# Patient Record
Sex: FEMALE | Race: WHITE
[De-identification: names, ages, dates, MRNs, and addresses within clinical notes are randomized per-mention and may not be internally consistent; named-entity substitution may affect disease eponyms.]

---

## 2017-01-25 ENCOUNTER — HOSPITAL ENCOUNTER (EMERGENCY)
Dept: HOSPITAL 58 - ED | Age: 47
Discharge: HOME | End: 2017-01-25

## 2017-01-25 VITALS — SYSTOLIC BLOOD PRESSURE: 135 MMHG | TEMPERATURE: 96.7 F | DIASTOLIC BLOOD PRESSURE: 73 MMHG

## 2017-01-25 VITALS — BODY MASS INDEX: 31.1 KG/M2

## 2017-01-25 DIAGNOSIS — G43.909: Primary | ICD-10-CM

## 2017-01-25 PROCEDURE — 99282 EMERGENCY DEPT VISIT SF MDM: CPT

## 2017-01-25 PROCEDURE — 96372 THER/PROPH/DIAG INJ SC/IM: CPT

## 2017-01-25 PROCEDURE — 99283 EMERGENCY DEPT VISIT LOW MDM: CPT

## 2017-01-25 NOTE — ED.PDOC
General


ED Provider: 


Dr. YINA JENSEN-ER





Chief Complaint: Headache


Stated Complaint: ibulises got a migraine ha--it started this am


Time Seen by Physician: 18:50


Mode of Arrival: Walk-In


Information Source: Patient, Family


Exam Limitations: No limitations


Primary Care Provider: 


MYRON LANGSTON





Nursing and Triage Documentation Reviewed and Agree: Yes





Neurological Complaint Exam





- Headache Complaint/Exam


Onset: Gradual


Duration: 12hrs


Symptoms Are: Still present


Timing: Constant


Worst Headache Ever: No


Initial Severity: Moderate


Current Severity: Moderate


Location: Diffuse


Character: Reports: Dull, Throbbing, Pressure, Typical headache, Migraine


Aggravating: Reports: Bright lights


Alleviating: Reports: None


Associated Signs and Symptoms: Reports: Nausea, Vomiting


Related History: Reports: Similar episode.  Denies: Recent trauma, Remote trauma


Related Surgical History: Reports: None


SAH Risk Factors: Reports: None


Meningitis Risk Factors: Reports: None


Temporal Arteritis Risk Factors: Reports: Female, 


Normal Head CT Within Last 12 Months: Yes


Fundoscopic Exam: Present: Normal Findings


Papilledema Present: No


Temporal Artery Tenderness: Present: None


Sinus Tenderness: Present: None


TMJ Tenderness: Present: None


Glascow Coma Scale (see protocol): 15


Meningeal Signs Positive: No


Pain on Passive Flexion-Positive Kernig's: No


ROM Limited In: No Limitiations


Focal Weakness: Present: None


Focal Sensory Loss: Present: None


Gait: Normal


Nystagmus Present: No


Gag Reflex Present: Yes


Finger-to-Nose: Normal Findings


Romberg Test Positive: No


Babinski Sign: Negative Right, Negative Left


Heel to Toe Normal: Yes


Differential Diagnoses: Migraine





Review of Systems





- Review Of Systems


Constitutional: Reports: No symptoms


Eyes: Reports: No symptoms


Ears, Nose, Mouth, Throat: Reports: No symptoms


Respiratory: Reports: No symptoms


Cardiac: Reports: No symptoms


GI: Reports: No symptoms


: Reports: No symptoms


Musculoskeletal: Reports: No symptoms


Skin: Reports: No symptoms


Neurological: Reports: Headache


Endocrine: Reports: No symptoms


Hematologic/Lymphatic: Reports: No symptoms


All Other Systems: Reviewed and Negative





Past Medical History





- Past Medical History


Previously Healthy: No


Endocrine: Reports: Hypothyroid


Cardiovascular: Reports: Hypertension


Respiratory: Reports: COPD, Asthma


Hematological: Reports: None


Gastrointestinal: Reports: GERD (nexium ), Pancreatitis (stents with 

pancreatitis-chronic pancreatitis)


Genitourinary: Reports: None


Neuro/Psych: Reports: Migraine, Anxiety.  Denies: Depression (fluoxetine)


Musculoskeletal: Reports: Back Pain, Other (soma phenergan)


Cancer: Reports: None


Last Menstrual Period: 


Other Pertinent Past Medical History: Chronic back pain ,intrathecal pain pump 

and removal





- Surgical History


General Surgical History: Reports: Hysterectomy, , Cholecystectomy, 

Orthopedic (bilat. feet surg), Other (stents with pancreatitis-chronic 

pancreatitis)





- Family History


Family History: Reports: None





- Social History


Smoking Status: Never smoker


Hx Substance Use: No


Alcohol Screening: Occasionally


Lives: With family





- Immunizations


Tetanus Shot up to Date: Yes





Physical Exam





- Physical Exam


Appearance: Well-appearing, No pain distress, Well-nourished


Eyes: YEIMY


ENT: Ears normal


Neck: Supple


Respiratory: Airway patent, Breath sounds clear, Breath sounds equal, 

Respirations nonlabored


Cardiovascular: RRR


GI/: Soft, Nontender, No masses, Bowel sounds normal, No Organomegaly


Musculoskeletal: Normal strength, ROM intact, No edema, No calf tenderness


Skin: Warm, Dry, Normal color


Neurological: Sensation intact, Alert, Oriented


Psychiatric: Affect appropriate





Re-Evaluation





- Re-Evaluation


Time of Re-Evaluation: 19:15


Status: Unchanged


Vital Signs Stable: Yes


Pain Level: 1


Appearance: NAD


Lungs: Clear


Skin: Warm and Dry


Neuro: Alert and Oriented X3


CV: RRR





Critical Care Note





- Critical Care Note


Total Time (mins): 0





Course





- Course


Orders, Labs, Meds: 





Orders











 Category Date Time Status


 


 Diphenhydramine Inj [Benadryl] MEDS  17 18:48 Stat





 25 mg IM ONCE STA   


 


 Hydromorphone HCl/Pf [Dilaudid 2 mg/ml Syringe] MEDS  17 18:48 Stat





 2 mg IM ONCE STA   


 


 Promethazine HCl [Phenergan 25 mg/ml Vial] MEDS  17 18:48 Stat





 25 mg IM ONCE STA   








Medications











Generic Name Dose Route Start Last Admin





  Trade Name Freq  PRN Reason Stop Dose Admin


 


Diphenhydramine HCl  25 mg  17 18:48  





  Benadryl  IM  17 18:49  





  ONCE STA   


 


Hydromorphone HCl  2 mg  17 18:48  





  Dilaudid 2 Mg/Ml Syringe  IM  17 18:49  





  ONCE STA   


 


Promethazine HCl  25 mg  17 18:48  





  Phenergan 25 Mg/Ml Vial  IM  17 18:49  





  ONCE STA   











Vital Signs: 





 











  Temp Pulse Resp BP Pulse Ox


 


 17 18:30  96.7 F L  113 H  20  135/73  93 L














Departure





- Departure


Time of Disposition: 18:52


Disposition: HOME SELF-CARE


Discharge Problem: 


Migraine


Qualifiers:


 Migraine type: unspecified Status migrainosus presence: without status 

migrainosus Intractability: not intractable Qualifier Code: (G43.909) Migraine, 

unspecified, not intractable, without status migrainosus





Instructions:  Migraine Headache (ED)


Condition: Good


Pt referred to PMD for follow-up: Yes


Additional Instructions: 


f/'u with pcp


Allergies/Adverse Reactions: 


Allergies





divalproex sodium [From Depakote] Adverse Reaction (Verified 17 18:38)


 


ketorolac [From Toradol] Adverse Reaction (Verified 17 18:38)


 


metoclopramide HCl [From Reglan] Adverse Reaction (Verified 17 18:38)


 








Home Medications: 


Ambulatory Orders





Carisoprodol [Soma] 350 mg PO PRN PRN 09/02/15 


Promethazine HCl [Phenergan Tab] 50 mg PO PRN PRN 09/02/15 


Amitriptyline HCl [Elavil] 100 mg PO DAILY 17 








Disposition Discussed With: Patient, Family

## 2017-02-16 ENCOUNTER — HOSPITAL ENCOUNTER (EMERGENCY)
Dept: HOSPITAL 58 - ED | Age: 47
Discharge: HOME | End: 2017-02-16

## 2017-02-16 VITALS — SYSTOLIC BLOOD PRESSURE: 128 MMHG | DIASTOLIC BLOOD PRESSURE: 95 MMHG | TEMPERATURE: 97.6 F

## 2017-02-16 VITALS — BODY MASS INDEX: 30.7 KG/M2

## 2017-02-16 DIAGNOSIS — G43.809: Primary | ICD-10-CM

## 2017-02-16 PROCEDURE — 99283 EMERGENCY DEPT VISIT LOW MDM: CPT

## 2017-02-16 PROCEDURE — 96372 THER/PROPH/DIAG INJ SC/IM: CPT

## 2017-02-16 NOTE — ED.PDOC
General


ED Provider: 


Dr. YINA JENSEN-ER





Chief Complaint: Headache


Stated Complaint: sal got a migraine


Time Seen by Physician: 12:55


Mode of Arrival: Walk-In


Information Source: Patient, Family


Exam Limitations: No limitations


Primary Care Provider: 


MYRON LANGSTON





Nursing and Triage Documentation Reviewed and Agree: Yes





Neurological Complaint Exam





- Headache Complaint/Exam


Onset: Gradual


Duration: several hours


Symptoms Are: Still present


Timing: Constant


Episodes Lasting: Hours


Worst Headache Ever: No


Initial Severity: Mild


Current Severity: Moderate


Location: Diffuse


Character: Reports: Dull, Throbbing, Pressure, Typical headache, Migraine


Aggravating: Reports: Bright lights


Alleviating: Reports: None


Associated Signs and Symptoms: Reports: Nausea, Vomiting.  Denies: Dizziness, 

Seizure, Sinus pressure, Fever, Neck pain, Neck stiffness, Decreased LOC, 

Visual changes


Related History: Reports: Similar episode


Related Surgical History: Reports: None


SAH Risk Factors: Reports: None


Meningitis Risk Factors: Reports: None


SDH Risk Factors: Reports: None


Temporal Arteritis Risk Factors: Reports: Female


Normal Head CT Within Last 12 Months: Yes


Fundoscopic Exam: Present: Normal Findings


Papilledema Present: No


Temporal Artery Tenderness: Present: None


Sinus Tenderness: Present: None


TMJ Tenderness: Present: None


Glascow Coma Scale (see protocol): 15


Meningeal Signs Positive: No


Pain on Passive Flexion-Positive Kernig's: No


ROM Limited In: No Limitiations


Focal Weakness: Present: None


Focal Sensory Loss: Present: None


Gait: Normal


Nystagmus Present: No


Gag Reflex Present: Yes


Finger-to-Nose: Normal Findings


Romberg Test Positive: No


Babinski Sign: Negative Right, Negative Left


Heel to Toe Normal: Yes


Differential Diagnoses: Migraine





Review of Systems





- Review Of Systems


Constitutional: Reports: No symptoms


Eyes: Reports: No symptoms


Ears, Nose, Mouth, Throat: Reports: No symptoms


Respiratory: Reports: No symptoms


Cardiac: Reports: No symptoms


GI: Reports: Nausea, Vomiting


: Reports: No symptoms


Musculoskeletal: Reports: No symptoms


Skin: Reports: No symptoms


Neurological: Reports: Headache


Endocrine: Reports: No symptoms


Hematologic/Lymphatic: Reports: No symptoms


All Other Systems: Reviewed and Negative





Past Medical History





- Past Medical History


Previously Healthy: No


Endocrine: Reports: Hypothyroid


Cardiovascular: Reports: Hypertension


Respiratory: Reports: COPD, Asthma


Hematological: Reports: None


Gastrointestinal: Reports: GERD (nexium ), Pancreatitis (stents with 

pancreatitis-chronic pancreatitis)


Genitourinary: Reports: None


Neuro/Psych: Reports: Migraine, Anxiety.  Denies: Depression (fluoxetine)


Musculoskeletal: Reports: Back Pain, Other (soma phenergan)


Cancer: Reports: None


Last Menstrual Period: 


Other Pertinent Past Medical History: Chronic back pain ,intrathecal pain pump 

and removal





- Surgical History


General Surgical History: Reports: Hysterectomy, , Cholecystectomy, 

Orthopedic (bilat. feet surg), Other (stents with pancreatitis-chronic 

pancreatitis)





- Family History


Family History: Reports: None





- Social History


Smoking Status: Never smoker


Hx Substance Use: No


Alcohol Screening: Occasionally





- Immunizations


Tetanus Shot up to Date: Yes





Physical Exam





- Physical Exam


Appearance: Well-appearing


Pain Distress: Moderate


Eyes: YEIMY, EOMI, Conjunctiva clear


ENT: Ears normal, Nose normal, Oropharynx normal


Neck: Supple


Respiratory: Airway patent, Breath sounds clear, Breath sounds equal, 

Respirations nonlabored


Cardiovascular: RRR, Pulses normal, No rub, No murmur


GI/: Soft, Nontender, No masses, Bowel sounds normal, No Organomegaly


Musculoskeletal: Normal strength, ROM intact, No edema, No calf tenderness


Skin: Warm, Dry, Normal color


Neurological: Sensation intact, Motor intact, Reflexes intact, Cranial nerves 

intact, Alert, Oriented


Psychiatric: Affect appropriate, Mood appropriate





Re-Evaluation





- Re-Evaluation


Time of Re-Evaluation: 13:40


Status: Improved


Vital Signs Stable: Yes


Pain Level: 2


Appearance: NAD


Lungs: Clear


Skin: Warm and Dry


Neuro: Alert and Oriented X3


CV: RRR





Critical Care Note





- Critical Care Note


Total Time (mins): 0





Course





- Course


Orders, Labs, Meds: 





Orders











 Category Date Time Status


 


 Diphenhydramine Inj [Benadryl] MEDS  17 13:08 Discontinued





 50 mg IM ONCE STA   


 


 Hydromorphone HCl/Pf [Dilaudid 2 mg/ml Syringe] MEDS  17 13:08 

Discontinued





 2 mg IM ONCE STA   


 


 Promethazine HCl [Phenergan 25 mg/ml Vial] MEDS  17 13:08 Discontinued





 25 mg IM ONCE STA   








Medications














Discontinued Medications














Generic Name Dose Route Start Last Admin





  Trade Name Freq  PRN Reason Stop Dose Admin


 


Diphenhydramine HCl  50 mg  17 13:08  





  Benadryl  IM  17 13:09  





  ONCE STA   


 


Hydromorphone HCl  2 mg  17 13:08  





  Dilaudid 2 Mg/Ml Syringe  IM  17 13:09  





  ONCE STA   


 


Promethazine HCl  25 mg  17 13:08  





  Phenergan 25 Mg/Ml Vial  IM  17 13:09  





  ONCE STA   











Vital Signs: 





 











  Temp Pulse Resp BP Pulse Ox


 


 17 12:53  97.6 F  96 H  20  128/95 H  96














Departure





- Departure


Time of Disposition: 13:14


Disposition: HOME SELF-CARE


Discharge Problem: 


Migraine


Qualifiers:


 Migraine type: other Status migrainosus presence: without status migrainosus 

Intractability: not intractable Qualifier Code: (G43.809) Other migraine, not 

intractable, without status migrainosus





Instructions:  Migraine Headache (ED)


Condition: Good


Pt referred to PMD for follow-up: Yes


Additional Instructions: 


f/u wtih pcp


Allergies/Adverse Reactions: 


Allergies





divalproex sodium [From Depakote] Adverse Reaction (Verified 17 18:38)


 


ketorolac [From Toradol] Adverse Reaction (Verified 17 18:38)


 


metoclopramide HCl [From Reglan] Adverse Reaction (Verified 17 18:38)


 








Home Medications: 


Ambulatory Orders





Carisoprodol [Soma] 350 mg PO PRN PRN 09/02/15 


Promethazine HCl [Phenergan Tab] 50 mg PO PRN PRN 09/02/15 


Amitriptyline HCl [Elavil] 100 mg PO DAILY 17 








Disposition Discussed With: Patient, Family

## 2017-03-01 ENCOUNTER — HOSPITAL ENCOUNTER (EMERGENCY)
Dept: HOSPITAL 58 - ED | Age: 47
Discharge: HOME | End: 2017-03-01

## 2017-03-01 VITALS — BODY MASS INDEX: 30.7 KG/M2

## 2017-03-01 VITALS — TEMPERATURE: 97.2 F

## 2017-03-01 VITALS — DIASTOLIC BLOOD PRESSURE: 89 MMHG | SYSTOLIC BLOOD PRESSURE: 132 MMHG

## 2017-03-01 DIAGNOSIS — G43.009: Primary | ICD-10-CM

## 2017-03-01 PROCEDURE — 99282 EMERGENCY DEPT VISIT SF MDM: CPT

## 2017-03-01 PROCEDURE — 96372 THER/PROPH/DIAG INJ SC/IM: CPT

## 2017-03-01 NOTE — ED.PDOC
General


ED Provider: 


Dr. CHANTEL MEDELLIN





Chief Complaint: Headache


Stated Complaint: Patient is a 46 year old female who comes to the ER with 

Headaches that started this evening. She has had problems sleeping and was seen 

by her PCP but was not given any thing for headache. She was given Doxepin for 

insomnia.   Feels like her typical Migraine.


Time Seen by Physician: 20:41


Mode of Arrival: Walk-In


Information Source: Patient


Exam Limitations: No limitations


Primary Care Provider: 


MYRON LANGSTON





Nursing and Triage Documentation Reviewed and Agree: Yes





Neurological Complaint Exam





- Headache Complaint/Exam


Onset: Gradual


Duration: 1 day 


Symptoms Are: Still present


Timing: Constant


Worst Headache Ever: No


Initial Severity: Moderate


Current Severity: Severe


Location: Right, Left, Frontal


Character: Reports: Dull, Throbbing


Aggravating: Reports: Bright lights


Alleviating: Reports: None


Associated Signs and Symptoms: Denies: Dizziness, Seizure, Nausea, Vomiting, 

Sinus pressure, Fever, Neck pain, Neck stiffness, Decreased LOC, Visual changes


Related History: Reports: Similar episode


Related Surgical History: Reports: None


SAH Risk Factors: Reports: None


Meningitis Risk Factors: Reports: None


SDH Risk Factors: Reports: None


Temporal Arteritis Risk Factors: Reports: Female, .  Denies: Over 60 

years old, Polymyalgia Rheumatica


Normal Head CT Within Last 12 Months: Yes (1 year ago )


Fundoscopic Exam: Present: Normal Findings


Papilledema Present: Yes


Temporal Artery Tenderness: Present: None


Sinus Tenderness: Present: None


TMJ Tenderness: Present: None


Glascow Coma Scale (see protocol): 15


Meningeal Signs Positive: No


Pain on Passive Flexion-Positive Kernig's: No


ROM Limited In: No Limitiations


Focal Weakness: Present: None


Focal Sensory Loss: Present: None


Gait: Normal


Nystagmus Present: No


Gag Reflex Present: No


Finger-to-Nose: Normal Findings


Romberg Test Positive: No


Babinski Sign: Negative Right, Negative Left


Heel to Toe Normal: No


Differential Diagnoses: Migraine, Sinus Headache, Tension Headache, Viral 

Syndrome





Review of Systems





- Review Of Systems


Constitutional: Reports: No symptoms


Eyes: Reports: Pain, Photophobia


Ears, Nose, Mouth, Throat: Reports: No symptoms


Respiratory: Reports: No symptoms


Cardiac: Reports: No symptoms


GI: Reports: No symptoms


: Reports: No symptoms


Musculoskeletal: Reports: No symptoms


Skin: Reports: No symptoms


Neurological: Reports: Anxiety


Endocrine: Reports: No symptoms


Hematologic/Lymphatic: Reports: No symptoms


All Other Systems: Reviewed and Negative





Past Medical History





- Past Medical History


Previously Healthy: No


Endocrine: Reports: Hypothyroid


Cardiovascular: Reports: Hypertension


Respiratory: Reports: COPD, Asthma


Hematological: Reports: None


Gastrointestinal: Reports: GERD (nexium ), Pancreatitis (stents with 

pancreatitis-chronic pancreatitis)


Genitourinary: Reports: None


Neuro/Psych: Reports: Migraine, Anxiety.  Denies: Depression (fluoxetine)


Musculoskeletal: Reports: Back Pain, Other (soma phenergan)


Cancer: Reports: None


Last Menstrual Period: NONE


Other Pertinent Past Medical History: Chronic back pain ,intrathecal pain pump 

and removal





- Surgical History


General Surgical History: Reports: Hysterectomy, , Cholecystectomy, 

Orthopedic (bilat. feet surg), Other (stents with pancreatitis-chronic 

pancreatitis)





- Family History


Family History: Reports: None





- Social History


Smoking Status: Never smoker


Hx Substance Use: No


Alcohol Screening: Occasionally





Physical Exam





- Physical Exam


Appearance: Ill-appearing


Pain Distress: Moderate


Eyes: YEIMY, EOMI, Conjunctiva clear


ENT: Ears normal, Nose normal, Oropharynx normal


Neck: Supple


Respiratory: Airway patent, Breath sounds clear, Breath sounds equal, 

Respirations nonlabored


Cardiovascular: RRR, Pulses normal, No rub, No murmur


GI/: Soft, Nontender, No masses, Bowel sounds normal, No Organomegaly


Musculoskeletal: Normal strength, ROM intact, No edema, No calf tenderness


Skin: Warm, Dry, Normal color


Neurological: Sensation intact, Motor intact, Reflexes intact, Cranial nerves 

intact, Alert, Oriented


Psychiatric: Anxious





Re-Evaluation





- Re-Evaluation


Time of Re-Evaluation: 21:41


Status: Improved


Vital Signs Stable: Yes


Pain Level: improved 





Critical Care Note





- Critical Care Note


Total Time (mins): 0





Course





- Course


Orders, Labs, Meds: 


Orders











 Category Date Time Status


 


 Butorphanol Tartrate [Stadol] MEDS  17 20:37 Discontinued





 2 mg IM ONCE STA   


 


 Promethazine HCl [Phenergan 25 mg/ml Vial] MEDS  17 20:37 Discontinued





 25 mg IM ONCE STA   








Medications














Discontinued Medications














Generic Name Dose Route Start Last Admin





  Trade Name Freq  PRN Reason Stop Dose Admin


 


Butorphanol Tartrate  2 mg  17 20:37  17 20:48





  Stadol  IM  17 20:38  2 mg





  ONCE STA   Administration


 


Promethazine HCl  25 mg  17 20:37  17 20:48





  Phenergan 25 Mg/Ml Vial  IM  17 20:38  25 mg





  ONCE STA   Administration











Vital Signs: 


 











  Temp Pulse Resp BP Pulse Ox


 


 17 20:00  97.2 F L  105 H  18  132/89  96














Departure





- Departure


Time of Disposition: 21:41


Disposition: HOME SELF-CARE


Discharge Problem: 


 Headache





Migraine


Qualifiers:


 Migraine type: without aura Status migrainosus presence: without status 

migrainosus Intractability: not intractable Qualifier Code: (G43.009) Migraine 

without aura, not intractable, without status migrainosus





Instructions:  Migraine Headache (ED)


Condition: Stable


Pt referred to PMD for follow-up: Yes


Additional Instructions: 


Push fluids


Rest Take your Doxiepine for sleep 


Follow up with PCP in 3 days 


Allergies/Adverse Reactions: 


Allergies





divalproex sodium [From Depakote] Adverse Reaction (Verified 17 20:04)


 


ketorolac [From Toradol] Adverse Reaction (Verified 17 20:04)


 


metoclopramide HCl [From Reglan] Adverse Reaction (Verified 17 20:04)


 








Home Medications: 


Ambulatory Orders





Carisoprodol [Soma] 350 mg PO PRN PRN 09/02/15 


Promethazine HCl [Phenergan Tab] 50 mg PO PRN PRN 09/02/15 


Amitriptyline HCl [Elavil] 100 mg PO DAILY 17 


Doxepin HCl [Sinequan] 25 mg PO BEDTIME 17 








Disposition Discussed With: Patient

## 2017-03-08 ENCOUNTER — HOSPITAL ENCOUNTER (EMERGENCY)
Dept: HOSPITAL 58 - ED | Age: 47
Discharge: HOME | End: 2017-03-08

## 2017-03-08 VITALS — TEMPERATURE: 96.3 F

## 2017-03-08 VITALS — BODY MASS INDEX: 32.3 KG/M2

## 2017-03-08 VITALS — SYSTOLIC BLOOD PRESSURE: 105 MMHG | DIASTOLIC BLOOD PRESSURE: 67 MMHG

## 2017-03-08 DIAGNOSIS — G43.009: Primary | ICD-10-CM

## 2017-03-08 PROCEDURE — 99283 EMERGENCY DEPT VISIT LOW MDM: CPT

## 2017-03-08 PROCEDURE — 96372 THER/PROPH/DIAG INJ SC/IM: CPT

## 2017-03-08 NOTE — ED.PDOC
General


ED Provider: 


Dr. CHANTEL MEDELLIN





Chief Complaint: Headache


Stated Complaint: Patient complains of frontal headaches. She was seen last 

week with similar symtoms given Stadol and phenergen. Has not followed up with 

Neurology. Has been having difficulty sleeping Was tried on doxepine for sleep 

which has not helped.


Time Seen by Physician: 05:30


Mode of Arrival: Walk-In


Information Source: Patient


Exam Limitations: No limitations


Primary Care Provider: 


MYRON LANGSTON





Nursing and Triage Documentation Reviewed and Agree: Yes





Neurological Complaint Exam





- Headache Complaint/Exam


Onset: Sudden


Duration: 5 HOURS


Symptoms Are: Still present


Timing: Constant


Initial Severity: Severe


Current Severity: Severe


Location: Right, Left, Frontal


Character: Reports: Throbbing, Typical headache


Aggravating: Reports: Bright lights


Associated Signs and Symptoms: Reports: Nausea.  Denies: Dizziness, Fever, Neck 

pain, Neck stiffness, Decreased LOC, Visual changes


Related History: Reports: Similar episode


SAH Risk Factors: Reports: None


Meningitis Risk Factors: Reports: None


SDH Risk Factors: Reports: None


Temporal Arteritis Risk Factors: Reports: Female, .  Denies: Over 60 

years old, Polymyalgia Rheumatica


Normal Head CT Within Last 12 Months: Yes


Fundoscopic Exam: Present: Normal Findings


Papilledema Present: No


Temporal Artery Tenderness: Present: None


Sinus Tenderness: Present: None


TMJ Tenderness: Present: None


Glascow Coma Scale (see protocol): 15


Meningeal Signs Positive: No


Focal Weakness: Present: None


Focal Sensory Loss: Present: None


Gait: Normal


Nystagmus Present: No


Gag Reflex Present: No


Finger-to-Nose: Normal Findings


Romberg Test Positive: No


Babinski Sign: Negative Right, Negative Left


Heel to Toe Normal: No


Head Picture: 


  __________________________














  __________________________





 1 - HEADACHE





Differential Diagnoses: Migraine





Review of Systems





- Review Of Systems


Constitutional: Reports: Loss of appetite


Eyes: Reports: Photophobia


Ears, Nose, Mouth, Throat: Reports: No symptoms


Respiratory: Reports: No symptoms


Cardiac: Reports: No symptoms


GI: Reports: Nausea


: Reports: No symptoms


Musculoskeletal: Reports: No symptoms


Skin: Reports: No symptoms


Neurological: Reports: Anxiety, Headache


Endocrine: Reports: No symptoms


Hematologic/Lymphatic: Reports: No symptoms


All Other Systems: Reviewed and Negative





Past Medical History





- Past Medical History


Previously Healthy: No


Endocrine: Reports: Hypothyroid


Cardiovascular: Reports: Hypertension


Respiratory: Reports: COPD, Asthma


Hematological: Reports: None


Gastrointestinal: Reports: GERD (nexium ), Pancreatitis (stents with 

pancreatitis-chronic pancreatitis)


Genitourinary: Reports: None


Neuro/Psych: Reports: Migraine, Anxiety.  Denies: Depression (fluoxetine)


Musculoskeletal: Reports: Back Pain, Other (soma phenergan)


Cancer: Reports: None


Last Menstrual Period: PT HAS HAD HYSTERECTOMY


Other Pertinent Past Medical History: Chronic back pain ,intrathecal pain pump 

and removal





- Surgical History


General Surgical History: Reports: Hysterectomy, , Cholecystectomy, 

Orthopedic (bilat. feet surg), Other (stents with pancreatitis-chronic 

pancreatitis)





- Family History


Family History: Reports: None





- Social History


Smoking Status: Never smoker


Hx Substance Use: No


Alcohol Screening: Occasionally





- Immunizations


Tetanus Shot up to Date: Yes





Physical Exam





- Physical Exam


Appearance: Ill-appearing, Obese


Ill-appearing: Moderate


Pain Distress: Severe


Eyes: YEIMY, EOMI, Conjunctiva clear


ENT: Ears normal


Neck: Supple


Respiratory: Airway patent, Breath sounds clear, Breath sounds equal, 

Respirations nonlabored


Cardiovascular: RRR, Pulses normal, No rub, No murmur


Skin: Warm


Neurological: Sensation intact, Motor intact, Reflexes intact, Cranial nerves 

intact, Alert, Oriented


Psychiatric: Anxious





Critical Care Note





- Critical Care Note


Total Time (mins): 0





Course





- Course


Orders, Labs, Meds: 





Orders











 Category Date Time Status


 


 Butorphanol Tartrate [Stadol] MEDS  17 05:45 Stat





 2 mg IM ONCE STA   


 


 Promethazine HCl [Phenergan 25 mg/ml Vial] MEDS  17 05:36 Stat





 25 mg IM ONCE STA   








Medications














Discontinued Medications














Generic Name Dose Route Start Last Admin





  Trade Name Freq  PRN Reason Stop Dose Admin


 


Butorphanol Tartrate  2 mg  17 05:45  





  Stadol  IM  17 05:46  





  ONCE STA   


 


Promethazine HCl  25 mg  17 05:36  





  Phenergan 25 Mg/Ml Vial  IM  17 05:37  





  ONCE STA   











Vital Signs: 





 











  Temp Pulse Resp BP Pulse Ox


 


 17 05:19  96.3 F L  95 H  18  142/93 H  95














Departure





- Departure


Time of Disposition: 06:00


Disposition: HOME SELF-CARE


Discharge Problem: 


Migraine


Qualifiers:


 Migraine type: without aura Status migrainosus presence: without status 

migrainosus Intractability: not intractable Qualifier Code: (G43.009) Migraine 

without aura, not intractable, without status migrainosus





Instructions:  Migraine Headache (ED)


Condition: Fair


Pt referred to PMD for follow-up: Yes


Additional Instructions: 


FOLLOW UP WITH YOUR pcp FOR NEUROLOGY CONSULT 


TAKE MEDICATIONS AS PRESCRIBED. 


Prescriptions: 


Sumatriptan Succinate [Imitrex] 50 mg PO TID PRN #10 tablet


 PRN Reason: HEADACHE


Allergies/Adverse Reactions: 


Allergies





divalproex sodium [From Depakote] Adverse Reaction (Verified 17 05:27)


 


ketorolac [From Toradol] Adverse Reaction (Verified 17 05:27)


 


metoclopramide HCl [From Reglan] Adverse Reaction (Verified 17 05:27)


 








Home Medications: 


Ambulatory Orders





Carisoprodol [Soma] 350 mg PO PRN PRN 09/02/15 


Promethazine HCl [Phenergan Tab] 50 mg PO PRN PRN 09/02/15 


Amitriptyline HCl [Elavil] 100 mg PO DAILY 17 


Doxepin HCl [Sinequan] 25 mg PO BEDTIME 17 


Sumatriptan Succinate [Imitrex] 50 mg PO TID PRN #10 tablet 17 








Disposition Discussed With: Patient, Family

## 2017-03-22 ENCOUNTER — HOSPITAL ENCOUNTER (EMERGENCY)
Dept: HOSPITAL 58 - ED | Age: 47
Discharge: HOME | End: 2017-03-22

## 2017-03-22 VITALS — TEMPERATURE: 96.9 F | DIASTOLIC BLOOD PRESSURE: 104 MMHG | SYSTOLIC BLOOD PRESSURE: 154 MMHG

## 2017-03-22 VITALS — BODY MASS INDEX: 32.3 KG/M2

## 2017-03-22 DIAGNOSIS — G43.909: Primary | ICD-10-CM

## 2017-03-22 PROCEDURE — 96372 THER/PROPH/DIAG INJ SC/IM: CPT

## 2017-03-22 PROCEDURE — 99283 EMERGENCY DEPT VISIT LOW MDM: CPT

## 2017-03-22 NOTE — ED.PDOC
General


ED Provider: 


Dr. ARACELY CALABRESE JR





Chief Complaint: Headache


Stated Complaint: THIS AM AROUND 0800 headache 96.9 88 20 94% 154/104 10/10 

frontal -medications helped last time feels that she has tried all 

preventatives no nerologist discussed ketamine ablation- will need neurologist 

states headache usual horrible pain no different does not want CT head.  Butorphanol 2 mg  Stadol,Phenergan 25 Mg:  Migraine Headache (ED) pcp FOR 

NEUROLOGY [Imitrex] 50 mg PO TID PRN #10 tablet.  17 2 mg  Stadol,

Phenergan 25 Mg.  17 benadryl 50 dilaudid 2 phenergan 25 migraine


Time Seen by Physician: 13:03


Mode of Arrival: Walk-In


Information Source: Patient, Family


Exam Limitations: No limitations


Primary Care Provider: 


MYRON LANGSTON





Nursing and Triage Documentation Reviewed and Agree: No





Review of Systems





- Review Of Systems


Constitutional: Reports: Malaise


Eyes: Reports: Photophobia


Respiratory: Reports: No symptoms


Cardiac: Reports: No symptoms


GI: Reports: No symptoms


: Reports: No symptoms


Musculoskeletal: Reports: No symptoms


Skin: Reports: No symptoms


Neurological: Reports: Headache


Endocrine: Reports: No symptoms


Hematologic/Lymphatic: Reports: No symptoms


All Other Systems: Other





Past Medical History





- Past Medical History


Previously Healthy: No


Endocrine: Reports: Hypothyroid


Cardiovascular: Reports: Hypertension


Respiratory: Reports: COPD, Asthma


Hematological: Reports: None


Gastrointestinal: Reports: GERD (nexium ), Pancreatitis (stents with 

pancreatitis-chronic pancreatitis)


Genitourinary: Reports: None


Neuro/Psych: Reports: Migraine, Anxiety.  Denies: Depression (fluoxetine)


Musculoskeletal: Reports: Back Pain, Other (soma phenergan)


Cancer: Reports: None


Last Menstrual Period: 


Other Pertinent Past Medical History: Chronic back pain ,intrathecal pain pump 

and removal





- Surgical History


General Surgical History: Reports: Hysterectomy, , Cholecystectomy, 

Orthopedic (bilat. feet surg), Other (stents with pancreatitis-chronic 

pancreatitis)





- Family History


Family History: Reports: None





- Social History


Smoking Status: Never smoker


Hx Substance Use: No


Alcohol Screening: Occasionally





- Immunizations


Tetanus Shot up to Date: Yes





Physical Exam





- Physical Exam


Appearance: Well-appearing, Obese


Ill-appearing: Moderate


Pain Distress: Severe


Eyes: YEIMY, EOMI, Conjunctiva clear


ENT: Ears normal, Nose normal, Oropharynx normal


Neck: Supple


Respiratory: Airway patent, Breath sounds clear, Breath sounds equal, 

Respirations nonlabored


Cardiovascular: RRR, Pulses normal, No rub, No murmur


GI/: Soft, Nontender, No masses, Bowel sounds normal, No Organomegaly


Musculoskeletal: Normal strength, ROM intact, No edema, No calf tenderness


Skin: Warm, Dry, Normal color


Neurological: Sensation intact, Motor intact, Reflexes intact, Cranial nerves 

intact, Alert, Oriented


Psychiatric: Affect appropriate





Critical Care Note





- Critical Care Note


Total Time (mins): 0





Course





- Course


Orders, Labs, Meds: 


Orders











 Category Date Time Status


 


 Butorphanol Tartrate [Stadol] MEDS  17 12:56 Discontinued





 2 mg IM ONCE STA   


 


 Promethazine HCl [Phenergan 25 mg/ml Vial] MEDS  17 12:56 Discontinued





 25 mg IM ONCE STA   


 


 CT HEAD W/O CONTRAST Stat RADS  17 12:57 Stop Req








Medications














Discontinued Medications














Generic Name Dose Route Start Last Admin





  Trade Name Freq  PRN Reason Stop Dose Admin


 


Butorphanol Tartrate  2 mg  17 12:56  17 13:05





  Stadol  IM  17 12:57  2 mg





  ONCE STA   Administration


 


Promethazine HCl  25 mg  17 12:56  17 13:05





  Phenergan 25 Mg/Ml Vial  IM  17 12:57  25 mg





  ONCE STA   Administration











Vital Signs: 


 











  Temp Pulse Resp BP Pulse Ox


 


 17 11:46  96.9 F L  88  20  154/104 H  94 L














Departure





- Departure


Time of Disposition: 13:44


Disposition: HOME SELF-CARE


Discharge Problem: 


 Headache, Migraine





Instructions:  Acute Headache (ED)


Condition: Good


Pt referred to PMD for follow-up: Yes


Additional Instructions: 


return if unable to keep fluids down, if symptoms are worse





no CT of the brain found at [facility]


need to define if any serious changes present in brain


CT Brain is recommended


no neurologist - will need neurologist


ketamine ablation has been reported to be helpful for intractable migraines





[facility] neurology takes Medicaid- you will need a referral from your physician


[phone] fax 


Allergies/Adverse Reactions: 


Allergies





divalproex sodium [From Depakote] Adverse Reaction (Verified 17 05:27)


 


ketorolac [From Toradol] Adverse Reaction (Verified 17 05:27)


 


metoclopramide HCl [From Reglan] Adverse Reaction (Verified 17 05:27)


 








Home Medications: 


Ambulatory Orders





Carisoprodol [Soma] 350 mg PO PRN PRN 09/02/15 


Promethazine HCl [Phenergan Tab] 50 mg PO PRN PRN 09/02/15 


Amitriptyline HCl [Elavil] 100 mg PO DAILY 17 


Doxepin HCl [Sinequan] 25 mg PO BEDTIME 17 


Sumatriptan Succinate [Imitrex] 50 mg PO TID PRN #10 tablet 17

## 2017-04-20 ENCOUNTER — HOSPITAL ENCOUNTER (EMERGENCY)
Dept: HOSPITAL 58 - ED | Age: 47
Discharge: HOME | End: 2017-04-20

## 2017-04-20 VITALS — SYSTOLIC BLOOD PRESSURE: 128 MMHG | DIASTOLIC BLOOD PRESSURE: 91 MMHG | TEMPERATURE: 97.8 F

## 2017-04-20 VITALS — BODY MASS INDEX: 33 KG/M2

## 2017-04-20 DIAGNOSIS — G43.909: Primary | ICD-10-CM

## 2017-04-20 PROCEDURE — 96372 THER/PROPH/DIAG INJ SC/IM: CPT

## 2017-04-20 PROCEDURE — 99283 EMERGENCY DEPT VISIT LOW MDM: CPT

## 2017-04-20 NOTE — CT
Exam:  CT exam of the brain without intravenous contrast. 

  

Comparison:  03/09/2016. 

  

Reason for exam:  Pain. 

  

FINDINGS:  No acute intracranial hemorrhage, mass effect, ventricular dilatation, or territorial inf
arction.  The quadrigeminal and ambient cisterns are patent.  There is no extraaxial fluid collectio
n.  The calvarium is intact without displaced fracture.  The paranasal sinuses and mastoid air cells
 are unopacified.  There is incidental note of plagiocephaly unchanged from the prior exam. 

  

Impression:  No acute intracranial findings.

## 2017-04-23 ENCOUNTER — HOSPITAL ENCOUNTER (EMERGENCY)
Dept: HOSPITAL 58 - ED | Age: 47
Discharge: HOME | End: 2017-04-23

## 2017-04-23 VITALS — SYSTOLIC BLOOD PRESSURE: 128 MMHG | TEMPERATURE: 97.2 F | DIASTOLIC BLOOD PRESSURE: 72 MMHG

## 2017-04-23 VITALS — BODY MASS INDEX: 33 KG/M2

## 2017-04-23 DIAGNOSIS — G43.009: Primary | ICD-10-CM

## 2017-04-23 PROCEDURE — 99283 EMERGENCY DEPT VISIT LOW MDM: CPT

## 2017-04-23 PROCEDURE — 96372 THER/PROPH/DIAG INJ SC/IM: CPT

## 2017-04-23 NOTE — ED.PDOC
General


ED Provider: 


Dr. YINA JENSEN-ER





Chief Complaint: Headache


Stated Complaint: sal got a migraine ha


Time Seen by Physician: 19:36


Mode of Arrival: Walk-In


Information Source: Patient


Exam Limitations: No limitations


Primary Care Provider: 


MYRON LANGSTON





Nursing and Triage Documentation Reviewed and Agree: Yes





Neurological Complaint Exam





- Headache Complaint/Exam


Onset: Gradual


Duration: several hours


Symptoms Are: Still present


Timing: Constant


Episodes Lasting: Hours


Worst Headache Ever: No


Initial Severity: Mild


Current Severity: Moderate


Location: Diffuse


Character: Reports: Dull, Throbbing, Typical headache, Migraine


Aggravating: Reports: Bright lights


Alleviating: Reports: None


Associated Signs and Symptoms: Reports: Nausea.  Denies: Dizziness, Seizure, 

Vomiting, Sinus pressure, Fever, Neck pain, Neck stiffness, Decreased LOC, 

Visual changes


Related History: Reports: Similar episode.  Denies: Recent trauma, Remote trauma


Related Surgical History: Reports: None


SAH Risk Factors: Reports: None


Meningitis Risk Factors: Reports: None


SDH Risk Factors: Reports: None


Temporal Arteritis Risk Factors: Reports: None, Female, 


Normal Head CT Within Last 12 Months: Yes


Fundoscopic Exam: Present: Normal Findings


Papilledema Present: No


Temporal Artery Tenderness: Present: None


Sinus Tenderness: Present: None


TMJ Tenderness: Present: None


Glascow Coma Scale (see protocol): 15


Meningeal Signs Positive: No


Pain on Passive Flexion-Positive Kernig's: No


ROM Limited In: No Limitiations


Focal Weakness: Present: None


Focal Sensory Loss: Present: None


Gait: Normal


Nystagmus Present: No


Gag Reflex Present: Yes


Finger-to-Nose: Normal Findings


Romberg Test Positive: No


Babinski Sign: Negative Right, Negative Left


Heel to Toe Normal: Yes


Differential Diagnoses: Migraine





Review of Systems





- Review Of Systems


Constitutional: Reports: No symptoms


Eyes: Reports: No symptoms


Ears, Nose, Mouth, Throat: Reports: No symptoms


Respiratory: Reports: No symptoms


Cardiac: Reports: No symptoms


GI: Reports: Nausea


: Reports: No symptoms


Musculoskeletal: Reports: No symptoms


Skin: Reports: No symptoms


Neurological: Reports: No symptoms


Endocrine: Reports: No symptoms


Hematologic/Lymphatic: Reports: No symptoms


All Other Systems: Reviewed and Negative





Past Medical History





- Past Medical History


Previously Healthy: No


Endocrine: Reports: Hypothyroid


Cardiovascular: Reports: Hypertension


Respiratory: Reports: COPD, Asthma


Hematological: Reports: None


Gastrointestinal: Reports: GERD (nexium ), Pancreatitis (stents with 

pancreatitis-chronic pancreatitis)


Genitourinary: Reports: None


Neuro/Psych: Reports: Migraine, Anxiety.  Denies: Depression (fluoxetine)


Musculoskeletal: Reports: Back Pain, Other (soma phenergan)


Cancer: Reports: None


Last Menstrual Period: PT HAS HAD A HYSTERECTOMY


Other Pertinent Past Medical History: Chronic back pain ,intrathecal pain pump 

and removal





- Surgical History


General Surgical History: Reports: Hysterectomy, , Cholecystectomy, 

Orthopedic (bilat. feet surg), Other (stents with pancreatitis-chronic 

pancreatitis)





- Family History


Family History: Reports: None





- Social History


Smoking Status: Never smoker


Hx Substance Use: No


Alcohol Screening: Occasionally


Lives: With family





- Immunizations


Tetanus Shot up to Date: Yes





Physical Exam





- Physical Exam


Appearance: Well-appearing, No pain distress, Well-nourished


Pain Distress: Moderate


Eyes: YEIMY, EOMI, Conjunctiva clear


ENT: Ears normal, Nose normal, Oropharynx normal


Neck: Supple


Respiratory: Airway patent


Cardiovascular: RRR, Pulses normal, No rub, No murmur


GI/: Soft, Nontender, No masses, Bowel sounds normal, No Organomegaly


Musculoskeletal: Normal strength, ROM intact, No edema, No calf tenderness


Skin: Warm


Neurological: Sensation intact


Psychiatric: Affect appropriate, Mood appropriate





Re-Evaluation





- Re-Evaluation


Time of Re-Evaluation: 20:00


Status: Improved


Vital Signs Stable: Yes


Pain Level: 1


Appearance: NAD


Lungs: Clear


Skin: Warm and Dry


Neuro: Alert and Oriented X3


CV: RRR





Critical Care Note





- Critical Care Note


Total Time (mins): 0





Course





- Course


Orders, Labs, Meds: 





Orders











 Category Date Time Status


 


 Butorphanol Tartrate [Stadol] MEDS  17 19:34 Discontinued





 2 mg IM ONCE STA   


 


 Promethazine HCl [Phenergan 25 mg/ml Vial] MEDS  17 19:34 Discontinued





 25 mg IM ONCE STA   








Medications














Discontinued Medications














Generic Name Dose Route Start Last Admin





  Trade Name Walterq  PRN Reason Stop Dose Admin


 


Butorphanol Tartrate  2 mg  17 19:34  





  Stadol  IM  17 19:35  





  ONCE STA   


 


Promethazine HCl  25 mg  17 19:34  





  Phenergan 25 Mg/Ml Vial  IM  17 19:35  





  ONCE STA   











Vital Signs: 





 











  Temp Pulse Resp BP Pulse Ox


 


 17 19:21  97.2 F L  106 H  20  128/72  96














Departure





- Departure


Time of Disposition: 19:38


Disposition: HOME SELF-CARE


Discharge Problem: 


Migraine


Qualifiers:


 Migraine type: without aura Status migrainosus presence: without status 

migrainosus Intractability: not intractable Qualifier Code: (G43.009) Migraine 

without aura, not intractable, without status migrainosus





Instructions:  Migraine Headache (ED)


Condition: Good


Pt referred to PMD for follow-up: Yes


Additional Instructions: 


f/u with pcp


Allergies/Adverse Reactions: 


Allergies





divalproex sodium [From Depakote] Adverse Reaction (Verified 17 19:26)


 


ketorolac [From Toradol] Adverse Reaction (Verified 17 19:26)


 


metoclopramide HCl [From Reglan] Adverse Reaction (Verified 17 19:26)


 








Home Medications: 


Ambulatory Orders





Carisoprodol [Soma] 350 mg PO PRN PRN 09/02/15 


Promethazine HCl [Phenergan Tab] 50 mg PO PRN PRN 09/02/15 


Amitriptyline HCl [Elavil] 100 mg PO DAILY 17 


Doxepin HCl [Sinequan] 25 mg PO BEDTIME 17 


Sumatriptan Succinate [Imitrex] 50 mg PO TID PRN #10 tablet 17 








Disposition Discussed With: Patient, Family

## 2017-05-14 ENCOUNTER — HOSPITAL ENCOUNTER (EMERGENCY)
Dept: HOSPITAL 58 - ED | Age: 47
Discharge: HOME | End: 2017-05-14

## 2017-05-14 VITALS — BODY MASS INDEX: 33 KG/M2

## 2017-05-14 VITALS — TEMPERATURE: 96 F | SYSTOLIC BLOOD PRESSURE: 145 MMHG | DIASTOLIC BLOOD PRESSURE: 87 MMHG

## 2017-05-14 DIAGNOSIS — G43.009: Primary | ICD-10-CM

## 2017-05-14 PROCEDURE — 99282 EMERGENCY DEPT VISIT SF MDM: CPT

## 2017-05-14 PROCEDURE — 96372 THER/PROPH/DIAG INJ SC/IM: CPT

## 2017-05-14 NOTE — ED.PDOC
General


ED Provider: 


Dr. CHE EISENBERG





Chief Complaint: Headache


Stated Complaint: typical migrane, home meds not helping


Time Seen by Physician: 09:08


Mode of Arrival: Walk-In


Information Source: Patient


Primary Care Provider: 


MYRON LANGSTON





Nursing and Triage Documentation Reviewed and Agree: Yes





Neurological Complaint Exam





- Headache Complaint/Exam


Onset: Gradual


Symptoms Are: Still present


Timing: Constant


Episodes Lasting: Minutes


Worst Headache Ever: No


Initial Severity: Severe


Current Severity: Severe


Location: Right, Left, Frontal


Character: Reports: Dull, Throbbing


Aggravating: Reports: None


Alleviating: Reports: None


Associated Signs and Symptoms: Denies: Dizziness, Seizure, Nausea, Vomiting, 

Sinus pressure, Fever, Neck pain, Neck stiffness, Decreased LOC, Visual changes


Related Surgical History: Reports: None


SAH Risk Factors: Reports: None


Meningitis Risk Factors: Reports: None


SDH Risk Factors: Reports: None


Temporal Arteritis Risk Factors: Reports: None


Normal Head CT Within Last 12 Months: Yes


Fundoscopic Exam: Present: Normal Findings


Papilledema Present: No


Temporal Artery Tenderness: Present: None


Sinus Tenderness: Present: None


TMJ Tenderness: Present: None


Meningeal Signs Positive: No


Pain on Passive Flexion-Positive Kernig's: No


ROM Limited In: No Limitiations


Focal Weakness: Present: None


Focal Sensory Loss: Present: None


Gait: Normal


Nystagmus Present: No


Gag Reflex Present: Yes


Finger-to-Nose: Normal Findings


Babinski Sign: Negative Right, Negative Left


Differential Diagnoses: Migraine





Review of Systems





- Review Of Systems


Constitutional: Reports: No symptoms


Eyes: Reports: No symptoms


Ears, Nose, Mouth, Throat: Reports: No symptoms


Respiratory: Reports: No symptoms


Cardiac: Reports: No symptoms


GI: Reports: No symptoms


: Reports: No symptoms


Musculoskeletal: Reports: No symptoms


Skin: Reports: No symptoms


Neurological: Reports: Headache


Endocrine: Reports: No symptoms


Hematologic/Lymphatic: Reports: No symptoms


All Other Systems: Reviewed and Negative





Past Medical History





- Past Medical History


Previously Healthy: No


Endocrine: Reports: Hypothyroid


Cardiovascular: Reports: Hypertension


Respiratory: Reports: COPD, Asthma


Hematological: Reports: None


Gastrointestinal: Reports: GERD (nexium ), Pancreatitis (stents with 

pancreatitis-chronic pancreatitis)


Genitourinary: Reports: None


Neuro/Psych: Reports: Migraine, Anxiety.  Denies: Depression (fluoxetine)


Musculoskeletal: Reports: Back Pain, Other (soma phenergan)


Cancer: Reports: None


Last Menstrual Period: N/A


Other Pertinent Past Medical History: Chronic back pain ,intrathecal pain pump 

and removal





- Surgical History


General Surgical History: Reports: Hysterectomy, , Cholecystectomy, 

Orthopedic (bilat. feet surg), Other (stents with pancreatitis-chronic 

pancreatitis)





- Family History


Family History: Reports: None





- Social History


Smoking Status: Never smoker


Hx Substance Use: No


Alcohol Screening: Occasionally





- Immunizations


Tetanus Shot up to Date: No





Physical Exam





- Physical Exam


Appearance: Ill-appearing, Obese


Pain Distress: Moderate


Eyes: EOMI


ENT: Ears normal, Nose normal, Oropharynx normal


Respiratory: Airway patent, Breath sounds clear, Breath sounds equal, 

Respirations nonlabored


Cardiovascular: RRR, Pulses normal, No rub, No murmur


GI/: Soft, Nontender, No masses, Bowel sounds normal, No Organomegaly


Musculoskeletal: Normal strength, ROM intact, No edema, No calf tenderness


Skin: Warm, Dry, Normal color


Neurological: Sensation intact, Motor intact, Reflexes intact, Cranial nerves 

intact, Alert, Oriented


Psychiatric: Affect appropriate, Mood appropriate





Critical Care Note





- Critical Care Note


Total Time (mins): 0





Course





- Course


Orders, Labs, Meds: 





Orders











 Category Date Time Status


 


 Butorphanol Tartrate [Stadol] MEDS  17 09:07 Stat





 2 mg IM ONCE STA   


 


 Promethazine HCl [Phenergan 25 mg/ml Vial] MEDS  17 09:07 Stat





 25 mg IM ONCE STA   








Medications














Discontinued Medications














Generic Name Dose Route Start Last Admin





  Trade Name Freq  PRN Reason Stop Dose Admin


 


Butorphanol Tartrate  2 mg  17 09:07  





  Stadol  IM  17 09:08  





  ONCE STA   


 


Promethazine HCl  25 mg  17 09:07  





  Phenergan 25 Mg/Ml Vial  IM  17 09:08  





  ONCE STA   











Vital Signs: 





 











  Temp Pulse Resp BP Pulse Ox


 


 17 08:48  96.0 F L  107 H  18  145/87 H  96














Departure





- Departure


Time of Disposition: 10:00


Disposition: HOME SELF-CARE


Discharge Problem: 


Migraine


Qualifiers:


 Migraine type: without aura Status migrainosus presence: without status 

migrainosus Intractability: not intractable Qualifier Code: (G43.009) Migraine 

without aura, not intractable, without status migrainosus





Instructions:  Migraine Headache (ED)


Condition: Stable


Pt referred to PMD for follow-up: No


Additional Instructions: 


Needs f/u with neurologist as out patient, as she keeps having the episodes 

despite the maintenance medications.


head dairy








Allergies/Adverse Reactions: 


Allergies





divalproex sodium [From Depakote] Adverse Reaction (Verified 17 08:55)


 


ketorolac [From Toradol] Adverse Reaction (Verified 17 08:55)


 


metoclopramide HCl [From Reglan] Adverse Reaction (Verified 17 08:55)


 








Home Medications: 


Ambulatory Orders





Carisoprodol [Soma] 350 mg PO PRN PRN 09/02/15 


Promethazine HCl [Phenergan Tab] 50 mg PO PRN PRN 09/02/15 


Amitriptyline HCl [Elavil] 100 mg PO DAILY 17 


Doxepin HCl [Sinequan] 25 mg PO BEDTIME 17 


Sumatriptan Succinate [Imitrex] 50 mg PO TID PRN #10 tablet 17 








Disposition Discussed With: Patient

## 2017-05-16 ENCOUNTER — HOSPITAL ENCOUNTER (EMERGENCY)
Dept: HOSPITAL 58 - ED | Age: 47
Discharge: HOME | End: 2017-05-16

## 2017-05-16 VITALS — BODY MASS INDEX: 33 KG/M2

## 2017-05-16 VITALS — TEMPERATURE: 98.8 F | SYSTOLIC BLOOD PRESSURE: 136 MMHG | DIASTOLIC BLOOD PRESSURE: 82 MMHG

## 2017-05-16 DIAGNOSIS — G43.009: Primary | ICD-10-CM

## 2017-05-16 PROCEDURE — 99283 EMERGENCY DEPT VISIT LOW MDM: CPT

## 2017-05-16 PROCEDURE — 96372 THER/PROPH/DIAG INJ SC/IM: CPT

## 2017-05-16 NOTE — ED.PDOC
General


ED Provider: 


Dr. CHE EISENBERG





Chief Complaint: Headache


Stated Complaint: typical headache, ran out of imitrex.


Time Seen by Physician: 19:42


Mode of Arrival: Walk-In


Information Source: Patient


Primary Care Provider: 


MYRON LANGSTON





Nursing and Triage Documentation Reviewed and Agree: Yes





Neurological Complaint Exam





- Headache Complaint/Exam


Onset: Gradual


Symptoms Are: Still present


Timing: Constant


Episodes Lasting: Hours


Worst Headache Ever: No


Initial Severity: Severe


Current Severity: Severe


Location: Right, Left, Frontal


Character: Reports: Throbbing


Aggravating: Reports: Bright lights


Alleviating: Reports: None


Associated Signs and Symptoms: Reports: Nausea.  Denies: Dizziness, Seizure, 

Vomiting, Sinus pressure, Fever, Neck pain, Neck stiffness, Decreased LOC, 

Visual changes


Related Surgical History: Reports: None


SAH Risk Factors: Reports: None


Meningitis Risk Factors: Reports: None


SDH Risk Factors: Reports: None


Temporal Arteritis Risk Factors: Reports: None


Normal Head CT Within Last 12 Months: Yes


Fundoscopic Exam: Present: Normal Findings


Temporal Artery Tenderness: Present: None


Sinus Tenderness: Present: None


TMJ Tenderness: Present: None


Meningeal Signs Positive: Yes


Pain on Passive Flexion-Positive Kernig's: Yes


ROM Limited In: No Limitiations


Focal Weakness: Present: None


Focal Sensory Loss: Present: None


Gait: Normal


Nystagmus Present: No


Gag Reflex Present: Yes


Finger-to-Nose: Normal Findings


Differential Diagnoses: Migraine





Review of Systems





- Review Of Systems


Constitutional: Reports: No symptoms


Eyes: Reports: No symptoms


Ears, Nose, Mouth, Throat: Reports: No symptoms


Respiratory: Reports: No symptoms


Cardiac: Reports: No symptoms


GI: Reports: No symptoms


: Reports: No symptoms


Musculoskeletal: Reports: No symptoms


Skin: Reports: No symptoms


Neurological: Reports: Headache


Endocrine: Reports: No symptoms


Hematologic/Lymphatic: Reports: No symptoms


All Other Systems: Reviewed and Negative





Past Medical History





- Past Medical History


Previously Healthy: No


Endocrine: Reports: Hypothyroid


Cardiovascular: Reports: Hypertension


Respiratory: Reports: COPD, Asthma


Hematological: Reports: None


Gastrointestinal: Reports: GERD (nexium ), Pancreatitis (stents with 

pancreatitis-chronic pancreatitis)


Genitourinary: Reports: None


Neuro/Psych: Reports: Migraine, Anxiety.  Denies: Depression (fluoxetine)


Musculoskeletal: Reports: Back Pain, Other (soma phenergan)


Cancer: Reports: None


Last Menstrual Period: na


Other Pertinent Past Medical History: Chronic back pain ,intrathecal pain pump 

and removal





- Surgical History


General Surgical History: Reports: Hysterectomy, , Cholecystectomy, 

Orthopedic (bilat. feet surg), Other (stents with pancreatitis-chronic 

pancreatitis)





- Family History


Family History: Reports: None





- Social History


Smoking Status: Never smoker


Hx Substance Use: No


Alcohol Screening: Occasionally





- Immunizations


Tetanus Shot up to Date: Yes





Physical Exam





- Physical Exam


Appearance: Ill-appearing, No pain distress, Well-nourished


Pain Distress: Moderate


Eyes: YEIMY, EOMI, Conjunctiva clear


ENT: Ears normal, Nose normal, Oropharynx normal


Respiratory: Airway patent, Breath sounds clear, Breath sounds equal, 

Respirations nonlabored


Cardiovascular: RRR, Pulses normal, No rub, No murmur


GI/: Soft, Nontender, No masses, Bowel sounds normal, No Organomegaly


Musculoskeletal: Normal strength, ROM intact, No edema, No calf tenderness


Skin: Warm, Dry, Normal color


Neurological: Sensation intact, Motor intact, Reflexes intact, Cranial nerves 

intact, Alert, Oriented


Psychiatric: Affect appropriate, Mood appropriate





Critical Care Note





- Critical Care Note


Total Time (mins): 0





Course





- Course


Orders, Labs, Meds: 


Orders











 Category Date Time Status


 


 Butorphanol Tartrate [Stadol] MEDS  17 19:39 Discontinued





 2 mg IM ONCE STA   


 


 Promethazine HCl [Phenergan 25 mg/ml Vial] MEDS  17 19:39 Discontinued





 25 mg IM ONCE STA   








Medications














Discontinued Medications














Generic Name Dose Route Start Last Admin





  Trade Name Freq  PRN Reason Stop Dose Admin


 


Butorphanol Tartrate  2 mg  17 19:39  17 19:50





  Stadol  IM  17 19:40  2 mg





  ONCE STA   Administration


 


Promethazine HCl  25 mg  17 19:39  17 19:47





  Phenergan 25 Mg/Ml Vial  IM  17 19:40  25 mg





  ONCE STA   Administration











Vital Signs: 


 











  Temp Pulse Resp BP Pulse Ox


 


 17 19:29  98.8 F  112 H  20  136/82  92 L














Departure





- Departure


Time of Disposition: 20:12


Disposition: HOME SELF-CARE


Discharge Problem: 


Migraine


Qualifiers:


 Migraine type: without aura Status migrainosus presence: without status 

migrainosus Intractability: not intractable Qualifier Code: (G43.009) Migraine 

without aura, not intractable, without status migrainosus





Instructions:  Migraine Headache (ED)


Condition: Stable


Pt referred to PMD for follow-up: Yes (neurologist)


Additional Instructions: 


needs f/u with neurologist


f/u at [facility].





Allergies/Adverse Reactions: 


Allergies





divalproex sodium [From Depakote] Adverse Reaction (Verified 17 19:37)


 


ketorolac [From Toradol] Adverse Reaction (Verified 17 19:37)


 


metoclopramide HCl [From Reglan] Adverse Reaction (Verified 17 19:37)


 








Home Medications: 


Ambulatory Orders





Carisoprodol [Soma] 350 mg PO PRN PRN 09/02/15 


Promethazine HCl [Phenergan Tab] 50 mg PO PRN PRN 09/02/15 


Amitriptyline HCl [Elavil] 100 mg PO DAILY 17 


Doxepin HCl [Sinequan] 25 mg PO BEDTIME 17 


Esomeprazole Magnesium [Nexium] 20 mg PO DAILY 17 








Disposition Discussed With: Patient, Family

## 2017-05-24 ENCOUNTER — HOSPITAL ENCOUNTER (EMERGENCY)
Dept: HOSPITAL 58 - ED | Age: 47
Discharge: HOME | End: 2017-05-24

## 2017-05-24 VITALS — TEMPERATURE: 97.7 F | DIASTOLIC BLOOD PRESSURE: 91 MMHG | SYSTOLIC BLOOD PRESSURE: 129 MMHG

## 2017-05-24 VITALS — BODY MASS INDEX: 33 KG/M2

## 2017-05-24 DIAGNOSIS — G43.909: Primary | ICD-10-CM

## 2017-05-24 DIAGNOSIS — Z79.899: ICD-10-CM

## 2017-05-24 DIAGNOSIS — Z98.890: ICD-10-CM

## 2017-05-24 PROCEDURE — 96365 THER/PROPH/DIAG IV INF INIT: CPT

## 2017-05-24 PROCEDURE — 99283 EMERGENCY DEPT VISIT LOW MDM: CPT

## 2017-05-24 PROCEDURE — 96375 TX/PRO/DX INJ NEW DRUG ADDON: CPT

## 2017-05-24 NOTE — ED.PDOC
General


ED Provider: 


Dr. CHANTEL MEDELLIN





Chief Complaint: Headache


Stated Complaint: patient is a 46 year old female who had some dental 

proceedures today


Time Seen by Physician: 19:09


Mode of Arrival: Walk-In


Information Source: Patient


Primary Care Provider: 


MYRON LANGSTON





Nursing and Triage Documentation Reviewed and Agree: Yes





Neurological Complaint Exam





- Headache Complaint/Exam


Onset: Sudden


Duration: constant 


Symptoms Are: Still present


Timing: Constant


Worst Headache Ever: No


Initial Severity: Moderate


Current Severity: Severe


Location: Right, Left, Frontal


Character: Reports: Throbbing


Aggravating: Reports: None


Alleviating: Reports: None


Associated Signs and Symptoms: Reports: Nausea.  Denies: Dizziness, Seizure, 

Vomiting, Sinus pressure, Fever, Neck pain, Neck stiffness, Decreased LOC, 

Visual changes


Related Surgical History: Reports: None


SAH Risk Factors: Reports: None


Meningitis Risk Factors: Reports: None


SDH Risk Factors: Reports: None


Temporal Arteritis Risk Factors: Reports: None


Normal Head CT Within Last 12 Months: Yes


Fundoscopic Exam: Present: Normal Findings


Papilledema Present: No


Temporal Artery Tenderness: Present: None


Sinus Tenderness: Present: None


TMJ Tenderness: Present: None


Meningeal Signs Positive: Yes


Pain on Passive Flexion-Positive Kernig's: Yes


ROM Limited In: No Limitiations


Focal Weakness: Present: None


Focal Sensory Loss: Present: None


Gait: Normal


Nystagmus Present: No


Gag Reflex Present: No


Finger-to-Nose: Normal Findings


Romberg Test Positive: No


Babinski Sign: Negative Right, Negative Left


Heel to Toe Normal: No


Differential Diagnoses: Migraine





Review of Systems





- Review Of Systems


Constitutional: Reports: No symptoms


Eyes: Reports: Photophobia


Ears, Nose, Mouth, Throat: Reports: Mouth pain


Respiratory: Reports: No symptoms


Cardiac: Reports: No symptoms


GI: Reports: No symptoms


: Reports: No symptoms


Musculoskeletal: Reports: No symptoms


Skin: Reports: No symptoms


Neurological: Reports: Anxiety, Headache


Endocrine: Reports: No symptoms


Hematologic/Lymphatic: Reports: No symptoms


All Other Systems: Reviewed and Negative





Past Medical History





- Past Medical History


Previously Healthy: No


Endocrine: Reports: Hypothyroid


Cardiovascular: Reports: Hypertension


Respiratory: Reports: COPD, Asthma


Hematological: Reports: None


Gastrointestinal: Reports: GERD (nexium ), Pancreatitis (stents with 

pancreatitis-chronic pancreatitis)


Genitourinary: Reports: None


Neuro/Psych: Reports: Migraine, Anxiety.  Denies: Depression (fluoxetine)


Musculoskeletal: Reports: Back Pain, Other (soma phenergan)


Cancer: Reports: None


Last Menstrual Period: HYSTERECTOMY


Other Pertinent Past Medical History: Chronic back pain ,intrathecal pain pump 

and removal





- Surgical History


General Surgical History: Reports: Hysterectomy, , Cholecystectomy, 

Orthopedic (bilat. feet surg), Other (stents with pancreatitis-chronic 

pancreatitis)





- Family History


Family History: Reports: None





- Social History


Smoking Status: Never smoker


Hx Substance Use: No


Alcohol Screening: Occasionally





Physical Exam





- Physical Exam


Appearance: Ill-appearing, Obese


Ill-appearing: Moderate


Pain Distress: Severe


Eyes: YEIMY, EOMI, Conjunctiva clear


ENT: Ears normal, Nose normal, Oropharynx normal


Neck: Supple


Respiratory: Airway patent, Breath sounds clear, Breath sounds equal, 

Respirations nonlabored


Cardiovascular: RRR, Pulses normal, No rub, No murmur


Musculoskeletal: Normal strength, ROM intact, No edema, No calf tenderness


Skin: Warm, Dry, Normal color


Neurological: Sensation intact, Motor intact, Alert, Oriented


Psychiatric: Anxious





Critical Care Note





- Critical Care Note


Total Time (mins): 0





Course





- Course


Orders, Labs, Meds: 


Orders











 Category Date Time Status


 


 ED IV/MEDIPORT/POWERPORT .ONCE EMERGENCY  17 19:25 Active


 


 0.9 % Sodium Chloride [Saline Flush] MEDS  17 19:25 Ordered





 1 syr IVF PRN PRN   


 


 Butorphanol Tartrate [Stadol] MEDS  17 19:25 Discontinued





 2 mg IVP ONCE STA   


 


 Promethazine HCl [Phenergan 25 mg/ml Vial] MEDS  17 20:00 Discontinued





 25 mg .ROUTE .STK-MED ONE   


 


 Promethazine HCl [Phenergan 25 mg/ml Vial] 25 mg MEDS  17 19:24 

Discontinued





 0.9 % Sodium Chloride [Sodium Chloride] 50 ml   





 IV ONCE   


 


 Sodium Chloride 0.9% [Sodium Chloride] 1,000 ml MEDS  17 19:24 

Discontinued





 IV BOLUS   








Medications











Generic Name Dose Route Start Last Admin





  Trade Name Freq  PRN Reason Stop Dose Admin


 


Sodium Chloride  1 syr  17 19:25  





  Saline Flush  IVF   





  PRN PRN   





  To flush IV   














Discontinued Medications














Generic Name Dose Route Start Last Admin





  Trade Name Freq  PRN Reason Stop Dose Admin


 


Butorphanol Tartrate  2 mg  17 19:25  17 20:08





  Stadol  IVP  17 19:26  2 mg





  ONCE STA   Administration


 


Promethazine HCl 25 mg/ Sodium  51 mls @ 75 mls/hr  17 19:24  17 20:

08





  Chloride  IV  17 20:04  75 mls/hr





  ONCE STA   Administration


 


Sodium Chloride  1,000 mls @ 1,000 mls/hr  17 19:24  17 20:08





  Sodium Chloride  IV  17 20:23  1,000 mls/hr





  BOLUS STA   Administration











Vital Signs: 


 











  Temp Pulse Resp BP Pulse Ox


 


 17 18:44  97.7 F  109 H  18  129/91 H  92 L














Departure





- Departure


Time of Disposition: 20:51


Disposition: HOME SELF-CARE


Discharge Problem: 


 Headache, Migraine





Instructions:  Migraine Headache (ED)


Condition: Stable


Pt referred to PMD for follow-up: Yes


Additional Instructions: 


Follow up with PCP in 3 days 


Take medications as prescribed. 


Prescriptions: 


Butalb/Acetaminophen/Caffeine [Fioricet] 1 each PO TID PRN #14 tablet


 PRN Reason: Headache


Promethazine HCl [Phenergan Tab] 25 mg PO Q6H PRN #15 tablet


 PRN Reason: Nausea / Vomiting


Allergies/Adverse Reactions: 


Allergies





divalproex sodium [From Depakote] Adverse Reaction (Verified 17 18:50)


 


ketorolac [From Toradol] Adverse Reaction (Verified 17 18:50)


 


metoclopramide HCl [From Reglan] Adverse Reaction (Verified 17 18:50)


 








Home Medications: 


Ambulatory Orders





Carisoprodol [Soma] 350 mg PO PRN PRN 09/02/15 


Amitriptyline HCl [Elavil] 100 mg PO DAILY 17 


Doxepin HCl [Sinequan] 25 mg PO BEDTIME 17 


Esomeprazole Magnesium [Nexium] 20 mg PO DAILY 17 


Butalb/Acetaminophen/Caffeine [Fioricet] 1 each PO TID PRN #14 tablet 17 


Promethazine HCl [Phenergan Tab] 25 mg PO Q6H PRN #15 tablet 17 








Disposition Discussed With: Patient, Family

## 2017-05-30 ENCOUNTER — HOSPITAL ENCOUNTER (EMERGENCY)
Dept: HOSPITAL 58 - ED | Age: 47
Discharge: LEFT BEFORE DISCHARGE | End: 2017-05-30

## 2017-05-30 VITALS — DIASTOLIC BLOOD PRESSURE: 93 MMHG | SYSTOLIC BLOOD PRESSURE: 137 MMHG | TEMPERATURE: 98.6 F

## 2017-05-30 VITALS — BODY MASS INDEX: 33 KG/M2

## 2017-05-30 DIAGNOSIS — K08.89: Primary | ICD-10-CM

## 2017-05-30 DIAGNOSIS — Z98.818: ICD-10-CM

## 2017-05-30 PROCEDURE — 99284 EMERGENCY DEPT VISIT MOD MDM: CPT

## 2017-05-30 NOTE — ED.PDOC
General


ED Provider: 


Dr. YINA JENSEN-ER





Chief Complaint: Tooth Problem


Stated Complaint: my tooth hurts--i had dental work last week and i cant chew 

on it..had surgery with dr macias in [place]


Time Seen by Physician: 20:35


Mode of Arrival: Walk-In


Information Source: Patient, Family


Exam Limitations: No limitations


Primary Care Provider: 


MYRON LANGSTON





Nursing and Triage Documentation Reviewed and Agree: Yes





EENT Complaint Exam





- Dental/Oral Complaint/Exam


Mechanism of Injury: No known trauma


Onset/Duration:  several days


Symptoms Are: Still present


Timing: Constant


Initial Severity: Mild


Current Severity: Moderate


Location: left lower jaw


Character: Reports: Dull, Aching, Throbbing


Aggravating: Reports: Heat, Cold, Chewing


Alleviating: Reports: Heat, Cold


Associated Signs and Symptoms: Denies: Swelling, Discharge, Fever, Foul odor, 

Foul taste in mouth


Related History: Reports: Previous tooth problem


Dental/Oral Surgical History: Reports: Periodontal Surgery


Tooth Findings: Present: Percussion tenderness.  Absent: Gross decay, Gross 

caries, Dental fracture, Abcess, Cellulitis


Cervical Lymphadenopathy Present: No


Facial Swelling Present: No


Bleeding Present: No


Oropharynx Findings: Absent: Clots, Active bleeding


Septal Hematoma: No


Foreign Body Present: No


Dysphagia Present: No


Drooling Present: No


Asymmetrical Tonsillar Swelling Present: No


Uvula Midline: Yes


Genny-tonsillar Fluctuence: No


Trismus Present: No


Palatal Petechiae Present: No


Scarlatinaform Rash Present: No


Differential Diagnoses: Dental Abcess, Dental Caries, Gingivitis, Odontogenic 

Pain, Periodontic Disease





Review of Systems





- Review Of Systems


Constitutional: Reports: No symptoms


Eyes: Reports: No symptoms


Ears, Nose, Mouth, Throat: Reports: Mouth pain


Respiratory: Reports: No symptoms


Cardiac: Reports: No symptoms


GI: Reports: No symptoms


: Reports: No symptoms


Musculoskeletal: Reports: No symptoms


Skin: Reports: No symptoms


Neurological: Reports: No symptoms


Endocrine: Reports: No symptoms


Hematologic/Lymphatic: Reports: No symptoms


All Other Systems: Reviewed and Negative





Past Medical History





- Past Medical History


Previously Healthy: No


Endocrine: Reports: Hypothyroid


Cardiovascular: Reports: Hypertension


Respiratory: Reports: COPD, Asthma


Hematological: Reports: None


Gastrointestinal: Reports: GERD (nexium ), Pancreatitis (stents with 

pancreatitis-chronic pancreatitis)


Genitourinary: Reports: None


Neuro/Psych: Reports: Migraine, Anxiety.  Denies: Depression (fluoxetine)


Musculoskeletal: Reports: Back Pain, Other (soma phenergan)


Cancer: Reports: None


Last Menstrual Period: none


Other Pertinent Past Medical History: Chronic back pain ,intrathecal pain pump 

and removal





- Surgical History


General Surgical History: Reports: Hysterectomy, , Cholecystectomy, 

Orthopedic (bilat. feet surg), Other (stents with pancreatitis-chronic 

pancreatitis)





- Family History


Family History: Reports: None





- Social History


Smoking Status: Never smoker


Hx Substance Use: No


Alcohol Screening: Occasionally


Lives: With family





Physical Exam





- Physical Exam


Appearance: Well-appearing, No pain distress, Well-nourished


Pain Distress: Mild


Eyes: YEIMY, EOMI, Conjunctiva clear


ENT: Ears normal, Nose normal, Oropharynx normal


Neck: Supple


Respiratory: Airway patent, Breath sounds clear, Breath sounds equal, 

Respirations nonlabored


Cardiovascular: RRR, Pulses normal, No rub, No murmur


GI/: Soft, Nontender, No masses, Bowel sounds normal, No Organomegaly


Musculoskeletal: Normal strength, ROM intact, No edema, No calf tenderness


Skin: Warm, Dry, Normal color


Neurological: Sensation intact, Motor intact, Reflexes intact, Cranial nerves 

intact, Alert, Oriented


Psychiatric: Affect appropriate, Mood appropriate





Critical Care Note





- Critical Care Note


Total Time (mins): 0





Course





- Course


Vital Signs: 





 











  Temp Pulse Resp BP Pulse Ox


 


 17 20:30  98.6 F  108 H  18  137/93 H  95














Departure





- Departure


Time of Disposition: 20:43


Disposition: AMA


Discharge Problem: 


 Toothache





Instructions:  Toothache (ED)


Condition: Good


Pt referred to PMD for follow-up: Yes


Additional Instructions: 


she stated she has not contacted her dentist about the problem--i offered her 

some pain meds until she could see her dentist tomorrow--she became angry and 

decided she was going to leave--her family suggested antbx and pain meds --i 

told them i have no problem with that but she became angry and upset and left--

i am not certain what the pateint was wanting--again she indicated she did not 

contact her surgeon today and did not want the treatment i was going to present 

to her


Allergies/Adverse Reactions: 


Allergies





divalproex sodium [From Depakote] Adverse Reaction (Verified 17 20:33)


 


ketorolac [From Toradol] Adverse Reaction (Verified 17 20:33)


 


metoclopramide HCl [From Reglan] Adverse Reaction (Verified 17 20:33)


 








Home Medications: 


Ambulatory Orders





Carisoprodol [Soma] 350 mg PO PRN PRN 09/02/15 


Amitriptyline HCl [Elavil] 100 mg PO DAILY 17 


Doxepin HCl [Sinequan] 25 mg PO BEDTIME 17 


Esomeprazole Magnesium [Nexium] 20 mg PO DAILY 17 


Butalb/Acetaminophen/Caffeine [Fioricet] 1 each PO TID PRN #14 tablet 17 


Promethazine HCl [Phenergan Tab] 25 mg PO Q6H PRN #15 tablet 17 








Transfer Form Completed: No


Disposition Discussed With: Patient, Family

## 2017-06-13 ENCOUNTER — HOSPITAL ENCOUNTER (OUTPATIENT)
Dept: HOSPITAL 58 - LAB | Age: 47
Discharge: HOME | End: 2017-06-13
Attending: SPECIALIST

## 2017-06-13 VITALS — BODY MASS INDEX: 33 KG/M2

## 2017-06-13 DIAGNOSIS — K86.1: ICD-10-CM

## 2017-06-13 DIAGNOSIS — K21.9: ICD-10-CM

## 2017-06-13 DIAGNOSIS — M25.549: ICD-10-CM

## 2017-06-13 DIAGNOSIS — G43.009: ICD-10-CM

## 2017-06-13 DIAGNOSIS — R13.13: Primary | ICD-10-CM

## 2017-06-13 LAB
ALBUMIN SERPL-MCNC: 3.7 G/DL (ref 3.4–5)
ALBUMIN/GLOB SERPL: 0.86 {RATIO}
ALP SERPL-CCNC: 126 U/L (ref 42–98)
ALT SERPL-CCNC: 19 U/L (ref 12–78)
ANION GAP SERPL CALC-SCNC: 14.8 MMOL/L
AST SERPL-CCNC: 18 U/L (ref 15–37)
BASOPHILS # BLD AUTO: 0 K/UL (ref 0–0.2)
BASOPHILS NFR BLD AUTO: 0.6 % (ref 0–3)
BILIRUB SERPL-MCNC: 0.28 MG/DL (ref 0–1.2)
BUN SERPL-MCNC: 14 MG/DL (ref 7–18)
BUN/CREAT SERPL: 12.61
CALCIUM SERPL-MCNC: 9.3 MG/DL (ref 8.2–10.2)
CHLORIDE SERPL-SCNC: 107 MMOL/L (ref 98–107)
CHOLEST SERPL-MCNC: 280 MG/DL (ref 0–200)
CHOLEST/HDLC SERPL: 5.6 {RATIO} (ref 4.5–5.5)
CO2 BLD-SCNC: 23 MMOL/L (ref 21–32)
CREAT SERPL-MCNC: 1.11 MG/DL (ref 0.6–1.3)
EOSINOPHIL # BLD AUTO: 0.2 K/UL (ref 0–0.7)
EOSINOPHIL NFR BLD AUTO: 2.9 % (ref 0–7)
ERYTHROCYTE [SEDIMENTATION RATE] IN BLOOD: 61 MM/HR (ref 0–20)
ESR INTERNAL QC: (no result)
GFR SERPLBLD BASED ON 1.73 SQ M-ARVRAT: 53 ML/MIN
GLOBULIN SER CALC-MCNC: 4.3 G/L
GLUCOSE SERPL-MCNC: 79 MG/DL (ref 70–110)
HCT VFR BLD AUTO: 38.8 % (ref 37–47)
HDLC SERPL-MCNC: 50 MG/DL (ref 35–80)
HGB BLD-MCNC: 13 G/DL (ref 12–16)
IMM GRANULOCYTES NFR BLD AUTO: 0.3 % (ref 0–5)
LYMPHOCYTES # SPEC AUTO: 1.9 K/UL (ref 0.6–3.4)
LYMPHOCYTES NFR BLD AUTO: 29.3 % (ref 10–50)
MCH RBC QN: 30.5 PG (ref 27–31)
MCHC RBC AUTO-ENTMCNC: 33.5 G/DL (ref 31.8–35.4)
MCV RBC: 91.1 FL (ref 81–99)
MONOCYTES # BLD AUTO: 0.4 K/UL (ref 0.4–2)
MONOCYTES NFR BLD AUTO: 5.8 % (ref 0–10)
NEUTROPHILS # BLD AUTO: 4 K/UL (ref 2–6.9)
NEUTROPHILS NFR BLD AUTO: 61.1 %
PLATELET # BLD AUTO: 316 10^3/UL (ref 140–440)
POTASSIUM SERPL-SCNC: 3.8 MMOL/L (ref 3.5–5.1)
PROT SERPL-MCNC: 8 G/DL (ref 6.4–8.2)
RBC # BLD AUTO: 4.26 10^6/UL (ref 4.2–5.4)
SODIUM SERPL-SCNC: 141 MMOL/L (ref 136–145)
TRIGL SERPL-MCNC: 226 MG/DL (ref 30–150)
VLDLC SERPL CALC-MCNC: 45 MG/DL (ref 2–30)
WBC # BLD AUTO: 6.58 K/UL (ref 4.6–10.2)

## 2017-06-13 PROCEDURE — 85025 COMPLETE CBC W/AUTO DIFF WBC: CPT

## 2017-06-13 PROCEDURE — 80053 COMPREHEN METABOLIC PANEL: CPT

## 2017-06-13 PROCEDURE — 80061 LIPID PANEL: CPT

## 2017-06-13 PROCEDURE — 36415 COLL VENOUS BLD VENIPUNCTURE: CPT

## 2017-06-13 PROCEDURE — 86140 C-REACTIVE PROTEIN: CPT

## 2017-06-13 PROCEDURE — 85651 RBC SED RATE NONAUTOMATED: CPT

## 2017-06-13 PROCEDURE — 84443 ASSAY THYROID STIM HORMONE: CPT

## 2017-06-13 PROCEDURE — 86038 ANTINUCLEAR ANTIBODIES: CPT

## 2017-06-14 LAB — CRP SERPL-MCNC: 6.1 MG/L (ref 0–4.9)

## 2017-06-16 ENCOUNTER — HOSPITAL ENCOUNTER (OUTPATIENT)
Dept: HOSPITAL 58 - RAD | Age: 47
Discharge: HOME | End: 2017-06-16
Attending: INTERNAL MEDICINE

## 2017-06-16 VITALS — BODY MASS INDEX: 33 KG/M2

## 2017-06-16 DIAGNOSIS — R13.13: Primary | ICD-10-CM

## 2017-06-16 NOTE — DI
EXAM:  Single contrast esophagram. 

  

History:  Dysphagia. 

  

Technique:  Patient was given oral barium and multiple spot films of the esophagus and gastroesophag
eal junction were obtained in various injections. 

  

Findings:  The course and caliber of the esophagus are within normal limits. No mucosal lesions or f
illing defects identified. With Valsalva maneuver there was a suggestion of a very small inconsisten
t hiatal hernia. At the end of the study, the patient had a difficulty clearing of the barium from t
he esophagus into the stomach which may indicate a mild dysmotility.  Gastroesophageal reflux was al
so likely. No gastric outlet obstruction 

  

Impression: A small inconsistent hiatal hernia.  Suggestion of mild degree of esophageal dysmotility
 and probable gastroesophageal reflux.

## 2017-07-09 ENCOUNTER — HOSPITAL ENCOUNTER (EMERGENCY)
Dept: HOSPITAL 58 - ED | Age: 47
Discharge: HOME | End: 2017-07-09

## 2017-07-09 VITALS — BODY MASS INDEX: 33.7 KG/M2

## 2017-07-09 VITALS — TEMPERATURE: 98.2 F | DIASTOLIC BLOOD PRESSURE: 92 MMHG | SYSTOLIC BLOOD PRESSURE: 138 MMHG

## 2017-07-09 DIAGNOSIS — Z79.899: ICD-10-CM

## 2017-07-09 DIAGNOSIS — R19.7: ICD-10-CM

## 2017-07-09 DIAGNOSIS — R10.30: Primary | ICD-10-CM

## 2017-07-09 DIAGNOSIS — R11.0: ICD-10-CM

## 2017-07-09 LAB
ADD URINE MICROSCOPIC: NO
ALBUMIN SERPL-MCNC: 3.7 G/DL (ref 3.4–5)
ALBUMIN/GLOB SERPL: 0.93 {RATIO}
ALP SERPL-CCNC: 135 U/L (ref 42–98)
ALT SERPL-CCNC: 20 U/L (ref 12–78)
AMYLASE SERPL-CCNC: 36 U/L (ref 25–115)
ANION GAP SERPL CALC-SCNC: 20.4 MMOL/L
AST SERPL-CCNC: 15 U/L (ref 15–37)
BASOPHILS # BLD AUTO: 0 K/UL (ref 0–0.2)
BASOPHILS NFR BLD AUTO: 0.3 % (ref 0–3)
BILIRUB SERPL-MCNC: 0.3 MG/DL (ref 0–1.2)
BUN SERPL-MCNC: 20 MG/DL (ref 7–18)
BUN/CREAT SERPL: 17.69
CALCIUM SERPL-MCNC: 9.2 MG/DL (ref 8.2–10.2)
CHLORIDE SERPL-SCNC: 104 MMOL/L (ref 98–107)
CK SERPL-CCNC: 59 U/L
CO2 BLD-SCNC: 19 MMOL/L (ref 21–32)
CREAT SERPL-MCNC: 1.13 MG/DL (ref 0.6–1.3)
EOSINOPHIL # BLD AUTO: 0.1 K/UL (ref 0–0.7)
EOSINOPHIL NFR BLD AUTO: 1.3 % (ref 0–7)
ERYTHROCYTE [SEDIMENTATION RATE] IN BLOOD: 41 MM/HR (ref 0–20)
ESR INTERNAL QC: (no result)
GFR SERPLBLD BASED ON 1.73 SQ M-ARVRAT: 52 ML/MIN
GLOBULIN SER CALC-MCNC: 4 G/L
GLUCOSE SERPL-MCNC: 113 MG/DL (ref 70–110)
HCT VFR BLD AUTO: 36.8 % (ref 37–47)
HGB BLD-MCNC: 12.8 G/DL (ref 12–16)
IMM GRANULOCYTES NFR BLD AUTO: 0.4 % (ref 0–5)
LIPASE SERPL-CCNC: 25 U/L (ref 8–78)
LYMPHOCYTES # SPEC AUTO: 2.1 K/UL (ref 0.6–3.4)
LYMPHOCYTES NFR BLD AUTO: 21.8 % (ref 10–50)
MCH RBC QN: 30.7 PG (ref 27–31)
MCHC RBC AUTO-ENTMCNC: 34.8 G/DL (ref 31.8–35.4)
MCV RBC: 88.2 FL (ref 81–99)
MONOCYTES # BLD AUTO: 0.5 K/UL (ref 0.4–2)
MONOCYTES NFR BLD AUTO: 5.2 % (ref 0–10)
NEUTROPHILS # BLD AUTO: 6.9 K/UL (ref 2–6.9)
NEUTROPHILS NFR BLD AUTO: 71 %
PH UR: 6 [PH] (ref 5–9)
PLATELET # BLD AUTO: 329 10^3/UL (ref 140–440)
POTASSIUM SERPL-SCNC: 3.4 MMOL/L (ref 3.5–5.1)
PROT SERPL-MCNC: 7.7 G/DL (ref 6.4–8.2)
RBC # BLD AUTO: 4.17 10^6/UL (ref 4.2–5.4)
SODIUM SERPL-SCNC: 140 MMOL/L (ref 136–145)
SP GR UR: 1.01 (ref 1–1.03)
TROPONIN I SERPL-MCNC: 0.01 NG/ML (ref 0–0.4)
WBC # BLD AUTO: 9.7 K/UL (ref 4.6–10.2)

## 2017-07-09 PROCEDURE — 84484 ASSAY OF TROPONIN QUANT: CPT

## 2017-07-09 PROCEDURE — 82550 ASSAY OF CK (CPK): CPT

## 2017-07-09 PROCEDURE — 96365 THER/PROPH/DIAG IV INF INIT: CPT

## 2017-07-09 PROCEDURE — 96375 TX/PRO/DX INJ NEW DRUG ADDON: CPT

## 2017-07-09 PROCEDURE — 96361 HYDRATE IV INFUSION ADD-ON: CPT

## 2017-07-09 PROCEDURE — 36415 COLL VENOUS BLD VENIPUNCTURE: CPT

## 2017-07-09 PROCEDURE — 96376 TX/PRO/DX INJ SAME DRUG ADON: CPT

## 2017-07-09 PROCEDURE — 82150 ASSAY OF AMYLASE: CPT

## 2017-07-09 PROCEDURE — 96367 TX/PROPH/DG ADDL SEQ IV INF: CPT

## 2017-07-09 PROCEDURE — 85651 RBC SED RATE NONAUTOMATED: CPT

## 2017-07-09 PROCEDURE — 80053 COMPREHEN METABOLIC PANEL: CPT

## 2017-07-09 PROCEDURE — 83690 ASSAY OF LIPASE: CPT

## 2017-07-09 PROCEDURE — 81001 URINALYSIS AUTO W/SCOPE: CPT

## 2017-07-09 PROCEDURE — 93005 ELECTROCARDIOGRAM TRACING: CPT

## 2017-07-09 PROCEDURE — 99283 EMERGENCY DEPT VISIT LOW MDM: CPT

## 2017-07-09 PROCEDURE — 85025 COMPLETE CBC W/AUTO DIFF WBC: CPT

## 2017-07-09 PROCEDURE — 93010 ELECTROCARDIOGRAM REPORT: CPT

## 2017-07-09 NOTE — DI
EXAM:  AP single view of the chest. 

  

HISTORY:  Pain. 

  

FINDINGS:  The bones are unremarkable.  The right-sided MediPort catheter is positioned with the tip
 at the level of the right atrium. The cardiac silhouette and pulmonary vasculature are within amy
l limits. The costophrenic angles are clear. No infiltrate or consolidation. 

  

Impression: No acute cardiopulmonary disease. 

  

MediPort catheter as described.

## 2017-07-09 NOTE — CT
EXAM:  CT of the abdomen and pelvis without contrast. 

  

HISTORY:  Abdominal pain. 

  

PROCEDURE:  Contiguous axial CT images of the abdomen and pelvis without contrast with coronal and s
agittal reformats. 

  

FINDINGS: There is minimal pneumobilia in the left lobe of the liver.  The gallbladder is surgically
 absent. The pancreas, spleen, adrenal glands and right kidney are normal in appearance.  There is l
eft renal cortical scarring.  There are nonobstructive calcifications in the left kidney. The abdomi
nal aorta is normal in appearance. The visualized loops of bowel and appendix are normal in appearan
ce. No free fluid or free air in the abdomen or pelvis. The bladder is adequately filled with no abn
ormality identified. The uterus is surgically absent. There are degenerative changes in the spine. T
here is a hematoma in the subcutaneous tissues of the right anterior abdominal wall measuring 5.6 x 
1.5 cm. There are injection granulomas in the bilateral flanks. 

  

Impression:  5.6 x 1.5 cm hematoma in the subcutaneous tissues of the right anterior abdominal wall.
 

  

Nonobstructive left nephrolithiasis. 

  

Left renal cortical scarring. 

  

Cholecystectomy with minimal pneumobilia. 

  

Hysterectomy.

## 2017-07-09 NOTE — ED.PDOC
General


Stated Complaint: im hurting=--c/o lower abd crmpaing wtih diarrhea and pain


Time Seen by Physician: 01:15


Mode of Arrival: Walk-In


Information Source: Patient, Family


Exam Limitations: No limitations


Nursing and Triage Documentation Reviewed and Agree: Yes





<YINA OWENS - Last Filed: 17 06:07>





<ARACELY GEORGE JR - Last Filed: 17 09:54>


ED Provider: 


Dr. ARACELY GEORGE JR





Chief Complaint: Abdominal Pain


Primary Care Provider: 


MYRON LANGSTON








GI Complaint Exam





- Abdominal Pain Complaint/Exam


Onset: Gradual


Duration: several hours


Symptoms Are: Still present


Timing: Constant


Initial Severity: Mild


Current Severity: Mild


Location of Pain: Suprapubic


Character: Reports: Dull, Aching, Cramping


Aggravating: Reports: None


Alleviating: Reports: None


Associated Signs and Symptoms: Reports: Nausea.  Denies: Diaphoresis, Fever, 

Cough, Chest pain, Dizziness, Back pain, Constipation, Blood in stool, Dysuria, 

Urinary frequency, Decreased urine output, Decreased appetite, Vaginal bleeding

, Vaginal discharge, Vomiting, Diarrhea, Sore throat, Decreased activity


Patient Rh Status: Unknown


Abdominal Findings: Present: None


Quality Indicator For Non-Traumatic Chest Pain/Syncope: EKG Performed





<YINA OWENS - Last Filed: 17 06:07>





Review of Systems





- Review Of Systems


Constitutional: Reports: No symptoms


Eyes: Reports: No symptoms


Ears, Nose, Mouth, Throat: Reports: No symptoms


Respiratory: Reports: No symptoms


Cardiac: Reports: No symptoms


GI: Reports: Abdominal pain, Diarrhea, Nausea


: Reports: No symptoms


Musculoskeletal: Reports: No symptoms


Skin: Reports: No symptoms


Neurological: Reports: No symptoms


Endocrine: Reports: No symptoms


Hematologic/Lymphatic: Reports: No symptoms


All Other Systems: Reviewed and Negative





<YINA OWENS - Last Filed: 17 06:07>





Past Medical History





- Past Medical History


Previously Healthy: No


Endocrine: Reports: Hypothyroid


Cardiovascular: Reports: Hypertension


Respiratory: Reports: COPD, Asthma


Hematological: Reports: None


Gastrointestinal: Reports: GERD (nexium ), Pancreatitis (stents with 

pancreatitis-chronic pancreatitis)


Genitourinary: Reports: None


Neuro/Psych: Reports: Migraine, Anxiety.  Denies: Depression (fluoxetine)


Musculoskeletal: Reports: Back Pain, Other (soma phenergan)


Cancer: Reports: None


Last Menstrual Period: 


Other Pertinent Past Medical History: Chronic back pain ,intrathecal pain pump 

and removal





- Surgical History


General Surgical History: Reports: Hysterectomy, , Cholecystectomy, 

Orthopedic (bilat. feet surg), Other (stents with pancreatitis-chronic 

pancreatitis)





- Family History


Family History: Reports: None





- Social History


Smoking Status: Never smoker


Hx Substance Use: No


Alcohol Screening: None


Lives: With family





- Immunizations


Tetanus Shot up to Date: No





<YINA OWENS - Last Filed: 17 06:07>





Physical Exam





- Physical Exam


Appearance: Well-appearing, No pain distress, Well-nourished


Eyes: YEIMY, EOMI, Conjunctiva clear


ENT: Ears normal, Nose normal, Oropharynx normal


Neck: Supple


Respiratory: Airway patent, Breath sounds clear, Breath sounds equal, 

Respirations nonlabored


Cardiovascular: RRR, Pulses normal, No rub, No murmur


GI/: Soft, Nontender, No masses, Bowel sounds normal, No Organomegaly


Musculoskeletal: Normal strength


Skin: Warm


Neurological: Sensation intact, Motor intact, Reflexes intact, Cranial nerves 

intact, Alert, Oriented


Psychiatric: Affect appropriate, Mood appropriate





<YINA OWENS - Last Filed: 17 06:07>





Interpretation





- Radiology Interpretation


Radiology Interpretation By: Radiologist


Radiology Results: Positive


Exam Interpreted: CT Scan ("dr dolan called about this report0--she does not 

have any bruising or denies any recent trauma to the area--it is appears this 

is the area where she had catheter/pump removed years ago ?scar tissue--dr dolan recommended repeating ct scan in a few hours to establish stability")





<YINA OWENS - Last Filed: 17 06:07>





Physician Notification





- Case Discussed


Physician Notified: dr george


Time of Notification: 07:00





<YINA OWENS - Last Filed: 17 06:07>





- Case Discussed


Endorsed To/Discussed With: dr alves


Time of Discussion: 06:57 (ct at 09)





<ARACELY GEORGE JR - Last Filed: 17 09:54>





Critical Care Note





- Critical Care Note


Total Time (mins): 0





<YINA OWENS - Last Filed: 17 06:07>





Course





- Course


Hematology/Chemistry: 


 17 02:10





 17 02:10





<YINA OWENS - Last Filed: 17 06:07>





- Course


Hematology/Chemistry: 


 17 02:10





 17 02:10





<ARACELY GEORGE JR - Last Filed: 17 09:54>





- Course


Orders, Labs, Meds: 


Lab Review











  17





  02:10 05:25


 


WBC  9.70 


 


RBC  4.17 L 


 


Hgb  12.8 


 


Hct  36.8 L 


 


MCV  88.2 


 


MCH  30.7 


 


MCHC  34.8 


 


RDW Coeff of Mike  13.5 


 


Plt Count  329 


 


Immature Gran % (Auto)  0.4 


 


Neut % (Auto)  71.0 


 


Lymph % (Auto)  21.8 


 


Mono % (Auto)  5.2 


 


Eos % (Auto)  1.3 


 


Baso % (Auto)  0.3 


 


Immature Gran # (Auto)  0.0 


 


Neut #  6.9 


 


Lymph #  2.1 


 


Mono #  0.5 


 


Eos #  0.1 


 


Baso #  0.0 


 


ESR  41 H 


 


Sodium  140 


 


Potassium  3.4 L 


 


Chloride  104 


 


Carbon Dioxide  19 L 


 


Anion Gap  20.4 


 


BUN  20 H 


 


Creatinine  1.13 


 


Estimated GFR (MDRD)  52.00 


 


BUN/Creatinine Ratio  17.69 


 


Glucose  113 H 


 


Calcium  9.2 


 


Total Bilirubin  0.30 


 


AST  15 


 


ALT  20 


 


Alkaline Phosphatase  135 H 


 


Total Creatine Kinase  59 


 


Troponin I  0.0100 


 


Total Protein  7.7 


 


Albumin  3.7 


 


Globulin  4.0 


 


Albumin/Globulin Ratio  0.93 


 


Amylase  36 


 


Lipase  25 


 


Urine Color   Yellow


 


Urine Clarity   Slightly


 


Urine pH   6.0


 


Ur Specific Gravity   1.015


 


Urine Protein   Negative


 


Urine Glucose (UA)   Negative


 


Urine Ketones   Negative


 


Urine Blood   Negative


 


Urine Nitrite   Negative


 


Urine Bilirubin   Negative


 


Urine Urobilinogen   0.2


 


Ur Leukocyte Esterase   Negative








Orders











 Category Date Time Status


 


 EKG-(ED ONLY) Stat CARDIO  17 01:30 Completed


 


 NPO REMINDER: IMAGING ONCE CARE  17 01:34 Completed


 


 NPO REMINDER: IMAGING ONCE CARE  17 06:11 Completed


 


 ED IV/MEDIPORT/POWERPORT .ONCE EMERGENCY  17 01:30 Active


 


 AMYLASE Stat LAB  17 02:10 Completed


 


 CBC W/ AUTO DIFF Stat LAB  17 02:10 Completed


 


 COMPREHENSIVE METABOLIC PANEL Stat LAB  17 02:10 Completed


 


 CREATINE KINASE Stat LAB  17 02:10 Completed


 


 ESR Stat LAB  17 02:10 Completed


 


 LIPASE Stat LAB  17 02:10 Completed


 


 TROPONIN I Stat LAB  17 02:10 Completed


 


 URINALYSIS C & S IF INDICATED Stat LAB  17 05:25 Completed


 


 0.9 % Sodium Chloride [Saline Flush] MEDS  17 01:30 Active





 1 syr IVF PRN PRN   


 


 Hydromorphone HCl [Dilaudid 1 mg/ml Syringe] MEDS  17 01:31 Discontinued





 1 mg IVP ONCE STA   


 


 Hydromorphone HCl [Dilaudid 1 mg/ml Syringe] MEDS  17 01:48 Active





 1 mg IVP Q1HR PRN   


 


 Promethazine HCl [Phenergan 25 mg/ml Vial] MEDS  17 02:13 Discontinued





 25 mg .ROUTE .STK-MED ONE   


 


 Promethazine HCl [Phenergan 25 mg/ml Vial] MEDS  17 05:41 Discontinued





 25 mg .ROUTE .STK-MED ONE   


 


 Promethazine HCl [Phenergan 25 mg/ml Vial] 25 mg MEDS  17 02:10 

Discontinued





 0.9 % Sodium Chloride [Sodium Chloride] 100 ml   





 IV ONCE   


 


 Promethazine HCl [Phenergan 25 mg/ml Vial] 25 mg MEDS  17 05:38 

Discontinued





 0.9 % Sodium Chloride [Sodium Chloride] 50 ml   





 IV ONCE   


 


 Sodium Chloride 0.9% [Sodium Chloride] 1,000 ml MEDS  17 01:30 Active





  mls/hr   


 


 CT ABDOMEN/PELVIS WO CONTRAST Stat RADS  17 03:03 Completed


 


 CT ABDOMEN/PELVIS WO CONTRAST Stat RADS  17 06:12 Completed


 


 CXR [CHEST, 1V AP ONLY] Stat RADS  17 01:34 Completed








Medications











Generic Name Dose Route Start Last Admin





  Trade Name Freq  PRN Reason Stop Dose Admin


 


Hydromorphone HCl  1 mg  17 01:48  17 05:53





  Dilaudid 1 Mg/Ml Syringe  IVP   1 mg





  Q1HR PRN   Administration





  Abdominal Pain   


 


Sodium Chloride  1,000 mls @ 100 mls/hr  17 01:30  17 02:01





  Sodium Chloride  IV  17 11:29  100 mls/hr





  .Q10H STA   Administration


 


Sodium Chloride  1 syr  17 01:30  





  Saline Flush  IVF   





  PRN PRN   





  To flush IV   














Discontinued Medications














Generic Name Dose Route Start Last Admin





  Trade Name Yoshi  PRN Reason Stop Dose Admin


 


Hydromorphone HCl  1 mg  17 01:31  17 02:01





  Dilaudid 1 Mg/Ml Syringe  IVP  17 01:32  1 mg





  ONCE STA   Administration


 


Promethazine HCl 25 mg/ Sodium  101 mls @ 200 mls/hr  17 02:10  17 

02:26





  Chloride  IV  17 02:40  200 mls/hr





  ONCE STA   Administration


 


Promethazine HCl 25 mg/ Sodium  51 mls @ 75 mls/hr  17 05:38  17 05:

51





  Chloride  IV  17 06:18  75 mls/hr





  ONCE STA   Administration











Vital Signs: 


 











  Temp Pulse Resp BP Pulse Ox


 


 17 01:07  98.2 F  92 H  20  138/92 H  96














Departure





<YINA OWENS - Last Filed: 17 06:07>





- Departure


Time of Disposition: 09:51


Pt referred to PMD for follow-up: Yes





<ARACELY GEORGE JR - Last Filed: 17 09:54>





- Departure


Disposition: HOME SELF-CARE


Discharge Problem: 


 Abdominal pain





Instructions:  Acute Abdominal Pain (ED)


Condition: Good


Additional Instructions: 


follow up with PMD this wek


return if  worse


discuss CT with PMD follow up with specialist as needed


Allergies/Adverse Reactions: 


Allergies





divalproex sodium [From Depakote] Adverse Reaction (Verified 17 01:14)


 


ketorolac [From Toradol] Adverse Reaction (Verified 17 01:14)


 


metoclopramide HCl [From Reglan] Adverse Reaction (Verified 17 01:14)


 








Home Medications: 


Ambulatory Orders





Carisoprodol [Soma] 350 mg PO PRN PRN 09/02/15 


Amitriptyline HCl [Elavil] 100 mg PO DAILY 17 


Doxepin HCl [Sinequan] 25 mg PO BEDTIME 17 


Esomeprazole Magnesium [Nexium] 20 mg PO DAILY 17 


Promethazine HCl [Phenergan Tab] 25 mg PO Q6H PRN #15 tablet 17

## 2017-07-09 NOTE — CT
EXAM:  CT of the abdomen pelvis without contrast 

  

Comparison:  CT abdomen pelvis 07/09/2017 

  

History:  Abdominal mass 

  

Technique:  Multiplanar CT images through the abdomen pelvis were obtained without the administratio
n of IV contrast 

  

Findings: Lung bases are free of consolidation.  No acute osseous abnormalities. 

  

Status post cholecystectomy with minimal pneumobilia again seen.  No peripancreatic inflammation.  A
drenal glands are unremarkable.  Left renal cortical scarring again noted.  No change in the left ne
phrolithiasis.  No hydronephrosis.  The appendix is not dilated or inflamed.  No free air and no asc
ites.  No bowel obstruction.  No bladder wall thickening.  Scattered colonic stool.  No perirectal i
nflammation.  Uterus is not seen. No focal liver or splenic lesions. 

  

No change in the hyperattenuating area within the subcutaneous soft tissues of the right anterior ab
dominal wall. 

  

Impression: 

1.  No change in the hyperattenuating area within the subcutaneous soft tissues of the right anterio
r abdominal wall could represent scarring or hematoma.  Neoplastic etiology is considered less likel
y but not excluded.  Follow-up CT can be obtained in 6 months to document stability and/or resolutio
n. 

2.  Nonobstructing left nephrolithiasis.

## 2017-07-19 ENCOUNTER — HOSPITAL ENCOUNTER (EMERGENCY)
Dept: HOSPITAL 58 - ED | Age: 47
Discharge: HOME | End: 2017-07-19

## 2017-07-19 VITALS — DIASTOLIC BLOOD PRESSURE: 92 MMHG | TEMPERATURE: 98.3 F | SYSTOLIC BLOOD PRESSURE: 140 MMHG

## 2017-07-19 VITALS — BODY MASS INDEX: 33 KG/M2

## 2017-07-19 DIAGNOSIS — M54.9: Primary | ICD-10-CM

## 2017-07-19 LAB
ADD URINE MICROSCOPIC: NO
PH UR: 7 [PH] (ref 5–9)
SP GR UR: 1.01 (ref 1–1.03)

## 2017-07-19 PROCEDURE — 99282 EMERGENCY DEPT VISIT SF MDM: CPT

## 2017-07-19 PROCEDURE — 96372 THER/PROPH/DIAG INJ SC/IM: CPT

## 2017-07-19 PROCEDURE — 81001 URINALYSIS AUTO W/SCOPE: CPT

## 2017-07-19 NOTE — CT
EXAM:  CT of the abdomen pelvis without intravenous contrast 07/19/2017.  Sagittal and coronal refor
matted images obtained 

  

HISTORY:  Right flank pain 

  

COMPARISON:  07/09/2017 

  

FINDINGS:  The liver shows no acute abnormality.  Gallbladder has been removed. 

  

The adrenal glands and kidneys show no acute abnormality. Nonobstructive left-sided nephrolithiasis.
  No hydronephrosis or obstructing ureteral stone. 

  

The spleen and pancreas show no acute abnormality. 

  

There is no bowel obstruction. No evidence of appendicitis.  The appendix is normal. 

  

Unremarkable urinary bladder. 

  

There is no free air or free fluid. 

  

The previously described high density within the subcutaneous fat of the right anterior abdominal wa
ll appears unchanged.  This could represent scarring or hematoma.  This measures approximately 5.9 x
 1.1 cm diameter, axial series image 76. 

  

IMPRESSION: 

1.  Stable nonspecific high density within the subcutaneous fat of the right anterior abdominal wall
. 

2.  Status post cholecystectomy. 

3.  Nonobstructive left nephrolithiasis 

4.  No urinary or bowel obstruction and normal appendix 

5.  No acute inflammatory process identified within the abdomen or pelvis within the limitation of a
 noncontrast enhanced examination.

## 2017-07-19 NOTE — ED.PDOC
General


ED Provider: 


Dr. YINA JENSEN-ER





Chief Complaint: Back Pain


Stated Complaint: my back is hurting--i have a herniated disc


Time Seen by Physician: 19:15


Mode of Arrival: Walk-In


Information Source: Patient


Exam Limitations: No limitations


Primary Care Provider: 


MYRON LANGSTON





Nursing and Triage Documentation Reviewed and Agree: Yes





Musculoskeletal Complaint Exam





- Back Pain Complaint/Exam


Mechanism of Injury: Reports: No known trauma


Onset/Duration: several days


Symptoms Are: Still present


Timing: Constant


Initial Severity: Mild


Current Severity: Moderate


Location: Reports: Discrete


Character: Reports: Aching, Throbbing


Aggravating: Reports: Movements, Lifting, Bending, Walking


Alleviating: Reports: None


Associated Signs and Symptoms: Denies: Swelling, Redness, Bruising, Fever, 

Weakness, Numbness, Tingling, Abdominal pain, Flank pain, Bladder incontinence, 

Bowel incontinence, Weight loss, Pain with weight bearing


Related History: Reports: Previous back injury


Epidural Abcess Risk Factors: Reports: None


Focal Tenderness: Yes


Paraspinal Muscle Tenderness: Yes


Paraspinal Muscle Spasm: No


Scoliosis: No


Lordosis: No


Kyphosis: No


SLR Test: Right Negative, Left Negative


Hip Motion Testing Pain: Right Negative, Left Negative


Focal Weakness: Present: None


Focal Sensory Loss: Present: None


Gait: Present: Abnormal


Differential Diagnoses: Herniated Disk





Review of Systems





- Review Of Systems


Constitutional: Reports: No symptoms


Eyes: Reports: No symptoms


Ears, Nose, Mouth, Throat: Reports: No symptoms


Respiratory: Reports: No symptoms


Cardiac: Reports: No symptoms


GI: Reports: No symptoms


: Reports: No symptoms


Musculoskeletal: Reports: Back pain


Skin: Reports: No symptoms


Neurological: Reports: No symptoms


Endocrine: Reports: No symptoms


Hematologic/Lymphatic: Reports: No symptoms


All Other Systems: Reviewed and Negative





Past Medical History





- Past Medical History


Previously Healthy: No


Endocrine: Reports: Hypothyroid


Cardiovascular: Reports: Hypertension


Respiratory: Reports: COPD, Asthma


Hematological: Reports: None


Gastrointestinal: Reports: GERD (nexium ), Pancreatitis (stents with 

pancreatitis-chronic pancreatitis)


Genitourinary: Reports: None


Neuro/Psych: Reports: Migraine, Anxiety.  Denies: Depression (fluoxetine)


Musculoskeletal: Reports: Back Pain, Other (soma phenergan)


Cancer: Reports: None


Last Menstrual Period: 


Other Pertinent Past Medical History: Chronic back pain ,intrathecal pain pump 

and removal





- Surgical History


General Surgical History: Reports: Hysterectomy, , Cholecystectomy, 

Orthopedic (bilat. feet surg), Other (stents with pancreatitis-chronic 

pancreatitis)





- Family History


Family History: Reports: None





- Social History


Smoking Status: Never smoker


Hx Substance Use: No


Alcohol Screening: Occasionally


Lives: With family





- Immunizations


Tetanus Shot up to Date: Yes





Physical Exam





- Physical Exam


Appearance: Well-appearing, No pain distress, Well-nourished


Pain Distress: Moderate


Eyes: YEIMY, EOMI, Conjunctiva clear


ENT: Ears normal, Nose normal, Oropharynx normal


Neck: Supple


Respiratory: Airway patent, Breath sounds clear, Breath sounds equal, 

Respirations nonlabored


Cardiovascular: RRR, Pulses normal, No rub, No murmur


GI/: Soft, Nontender, No masses, Bowel sounds normal, No Organomegaly


Musculoskeletal: Limited ROM


Skin: Warm, Dry, Normal color


Neurological: Sensation intact, Motor intact, Reflexes intact, Cranial nerves 

intact, Alert, Oriented


Psychiatric: Affect appropriate, Mood appropriate





Re-Evaluation





- Re-Evaluation


Time of Re-Evaluation: 20:12


Status: Improved


Vital Signs Stable: Yes


Pain Level: 1


Appearance: NAD


Lungs: Clear


Skin: Warm and Dry


Neuro: Alert and Oriented X3


CV: RRR





Critical Care Note





- Critical Care Note


Total Time (mins): 0





Course





- Course


Orders, Labs, Meds: 


Lab Review











  17





  19:25


 


Urine Color  Yellow


 


Urine Clarity  Clear


 


Urine pH  7.0


 


Ur Specific Gravity  1.010


 


Urine Protein  Negative


 


Urine Glucose (UA)  Negative


 


Urine Ketones  Negative


 


Urine Blood  Negative


 


Urine Nitrite  Negative


 


Urine Bilirubin  Negative


 


Urine Urobilinogen  0.2


 


Ur Leukocyte Esterase  Negative








Orders











 Category Date Time Status


 


 UA [URINALYSIS C & S IF INDICATED] Stat LAB  17 19:25 Completed


 


 Hydromorphone HCl/Pf [Dilaudid 2 mg/ml Syringe] MEDS  17 19:20 

Discontinued





 2 mg IM ONCE STA   


 


 Promethazine HCl [Phenergan 25 mg/ml Vial] MEDS  17 19:20 Discontinued





 25 mg IM ONCE STA   


 


 CT ABDOMEN/PELVIS WO CONTRAST Stat RADS  17 19:19 Completed


 


 CT LUMBAR SPINE W/O CONTRAST Stat RADS  17 19:19 Completed








Medications














Discontinued Medications














Generic Name Dose Route Start Last Admin





  Trade Name Freq  PRN Reason Stop Dose Admin


 


Hydromorphone HCl  2 mg  17 19:20  17 19:28





  Dilaudid 2 Mg/Ml Syringe  IM  17 19:21  2 mg





  ONCE STA   Administration


 


Promethazine HCl  25 mg  17 19:20  17 19:29





  Phenergan 25 Mg/Ml Vial  IM  17 19:21  25 mg





  ONCE STA   Administration











Vital Signs: 


 











  Temp Pulse Resp BP Pulse Ox


 


 17 19:10  98.3 F  80  20  140/92 H  96














Departure





- Departure


Time of Disposition: 20:12


Disposition: HOME SELF-CARE


Discharge Problem: 


 Backache





Instructions:  Back Pain (ED)


Condition: Good


Pt referred to PMD for follow-up: Yes


Additional Instructions: 


f/u with pcp this week


Allergies/Adverse Reactions: 


Allergies





divalproex sodium [From Depakote] Adverse Reaction (Verified 17 19:15)


 


ketorolac [From Toradol] Adverse Reaction (Verified 17 19:15)


 


metoclopramide HCl [From Reglan] Adverse Reaction (Verified 17 19:15)


 








Home Medications: 


Ambulatory Orders





Carisoprodol [Soma] 350 mg PO PRN PRN 09/02/15 


Amitriptyline HCl [Elavil] 100 mg PO DAILY 17 


Doxepin HCl [Sinequan] 25 mg PO BEDTIME 17 


Esomeprazole Magnesium [Nexium] 20 mg PO DAILY 17 


Promethazine HCl [Phenergan Tab] 25 mg PO Q6H PRN #15 tablet 17 








Disposition Discussed With: Patient, Family

## 2017-07-19 NOTE — CT
Exam:  CT of the lumbar spine without intravenous contrast. 

  

Comparison:  CT of the abdomen and pelvis performed 07/09/2017. 

  

Reason for exam:  Low back pain. 

  

FINDINGS:  No acute fracture or listhesis.  Degenerative disease.  Nonobstructive left renal calculi
 are seen. 

  

L1-L2:  No significant central canal stenosis or foraminal narrowing. 

  

L2-L3:  No significant central canal stenosis or foraminal narrowing. 

  

L3-L4:  No significant central canal stenosis or foraminal narrowing. 

  

L4-L5:  Broad-based disc bulge evident on the thecal sac and no significant foraminal stenosis 

  

L5-S1:  Broad-based disc bulge with narrowing of the central canal and by foraminal stenosis seconda
ry to the extruded disc and facet hypertrophy. 

  

Impression: 

1.  No acute fracture or listhesis in the lumbar spine. 

2.  Degenerative disease with broad-based disc bulges at L4-L5 and L5-S1. 

3.  Incidental note of a left renal calculus. 

  

Report faxed at 2003 hours on 07/19/2017.

## 2017-08-06 ENCOUNTER — HOSPITAL ENCOUNTER (EMERGENCY)
Dept: HOSPITAL 58 - ED | Age: 47
Discharge: HOME | End: 2017-08-06

## 2017-08-06 VITALS — BODY MASS INDEX: 35.2 KG/M2

## 2017-08-06 VITALS — SYSTOLIC BLOOD PRESSURE: 124 MMHG | TEMPERATURE: 97.3 F | DIASTOLIC BLOOD PRESSURE: 85 MMHG

## 2017-08-06 DIAGNOSIS — G43.909: Primary | ICD-10-CM

## 2017-08-06 PROCEDURE — 99282 EMERGENCY DEPT VISIT SF MDM: CPT

## 2017-08-06 PROCEDURE — 96372 THER/PROPH/DIAG INJ SC/IM: CPT

## 2017-08-06 RX ADMIN — SODIUM CHLORIDE STA MG: 9 INJECTION, SOLUTION INTRAVENOUS at 18:51

## 2017-08-06 RX ADMIN — BUTORPHANOL TARTRATE STA MG: 2 INJECTION, SOLUTION INTRAMUSCULAR; INTRAVENOUS at 18:51

## 2017-08-06 NOTE — ED.PDOC
General


ED Provider: 


Dr. ARACELY CALABRESE JR





Chief Complaint: Headache


Stated Complaint: PERSISTENT HEADACHE NO RESPONSE TO ADVIL MIGRAINE.   LIKE 

PREVIOUS HEADACHES...TEMPLE TO TEMPLE FRONTAL- NO BENEFIT FROM PREVENTATIVE IN 

PAST, THREE PREVENTATIVE MEDS FELT TO HAVE CAUSED PANCREATITIS [ End ]97.3 87 

20 5/10 124/85 8/10.  negative CT head 4/2/17


Time Seen by Physician: 18:31


Mode of Arrival: Walk-In


Information Source: Patient


Exam Limitations: No limitations


Primary Care Provider: 


MYRON LANGSTON





Nursing and Triage Documentation Reviewed and Agree: No





Review of Systems





- Review Of Systems


Constitutional: Reports: Malaise


Eyes: Reports: Photophobia


Ears, Nose, Mouth, Throat: Reports: No symptoms


Respiratory: Reports: No symptoms


Cardiac: Reports: No symptoms


GI: Reports: Nausea, Vomiting


: Reports: No symptoms


Musculoskeletal: Reports: No symptoms


Skin: Reports: No symptoms


Neurological: Reports: Headache


Endocrine: Reports: No symptoms


Hematologic/Lymphatic: Reports: No symptoms


All Other Systems: Other





Past Medical History





- Past Medical History


Previously Healthy: Yes


Endocrine: Reports: Hypothyroid


Cardiovascular: Reports: Hypertension


Respiratory: Reports: COPD, Asthma


Hematological: Reports: None


Gastrointestinal: Reports: GERD (nexium ), Pancreatitis (stents with 

pancreatitis-chronic pancreatitis)


Genitourinary: Reports: None


Neuro/Psych: Reports: Migraine, Seizure, Anxiety.  Denies: Depression (

fluoxetine)


Musculoskeletal: Reports: Arthritis, Back Pain, Other (soma phenergan)


Cancer: Reports: None


Last Menstrual Period: pancret arth migr asth sz anem


Other Pertinent Past Medical History: Chronic back pain ,intrathecal pain pump 

and removal MEDIPORT





- Surgical History


General Surgical History: Reports: Hysterectomy (PARTIAL HYSTERECTOMY 2010), C-

section, Cholecystectomy, Tonsillectomy, Adenoidectomy, Orthopedic (bilat. feet 

surg), Other (stents with pancreatitis-chronic pancreatitis)





- Family History


Family History: Reports: None





- Social History


Smoking Status: Never smoker


Hx Substance Use: No


Alcohol Screening: Occasionally





- Immunizations


Tetanus Shot up to Date: Yes





Physical Exam





- Physical Exam


Appearance: Well-appearing, Obese


Pain Distress: Moderate


Eyes: YEIMY


ENT: Ears normal, Nose normal, Oropharynx normal


Neck: Supple


Respiratory: Airway patent, Breath sounds clear, Breath sounds equal, 

Respirations nonlabored


Cardiovascular: RRR, Pulses normal, No rub, No murmur


GI/: Soft, Nontender, No masses, Bowel sounds normal, No Organomegaly


Musculoskeletal: Normal strength, ROM intact, No edema, No calf tenderness


Skin: Warm, Dry, Normal color


Neurological: Sensation intact, Motor intact, Reflexes intact, Cranial nerves 

intact, Alert, Oriented


Psychiatric: Affect appropriate, Mood appropriate





Critical Care Note





- Critical Care Note


Total Time (mins): 5





Course





- Course


Orders, Labs, Meds: 


Orders











 Category Date Time Status


 


 Butorphanol Tartrate [Stadol] MEDS  08/06/17 18:29 Discontinued





 2 mg IM ONCE STA   


 


 Promethazine HCl [Phenergan 25 mg/ml Vial] MEDS  08/06/17 18:29 Discontinued





 25 mg IM ONCE STA   








Medications














Discontinued Medications














Generic Name Dose Route Start Last Admin





  Trade Name Freq  PRN Reason Stop Dose Admin


 


Butorphanol Tartrate  2 mg  08/06/17 18:29  





  Stadol  IM  08/06/17 18:30  





  ONCE STA   


 


Promethazine HCl  25 mg  08/06/17 18:29  





  Phenergan 25 Mg/Ml Vial  IM  08/06/17 18:30  





  ONCE STA   











Vital Signs: 


 











  Temp Pulse Resp BP Pulse Ox


 


 08/06/17 18:12  97.3 F L  87  20  124/85  5 L














Departure





- Departure


Time of Disposition: 18:41


Disposition: HOME SELF-CARE


Discharge Problem: 


 Headache, Migraine





Instructions:  Migraine Headache (ED)


Condition: Good


Pt referred to PMD for follow-up: Yes


Additional Instructions: 


DISCUSS STADOL TREATMENT WITH NEUROLOGIST


RETURN IF UNABLE TO KEEP LIQUIDS DOWN


CLEAR LIQUIDS FOR 4-8 HOURS AFTER NAUSEA FOR 8-12 HOURS AFTER EMESIS


RECHECKpmd THIS WEEK


Allergies/Adverse Reactions: 


Allergies





divalproex sodium [From Depakote] Adverse Reaction (Verified 08/06/17 18:19)


 


ketorolac [From Toradol] Adverse Reaction (Verified 08/06/17 18:19)


 


metoclopramide HCl [From Reglan] Adverse Reaction (Verified 08/06/17 18:19)


 








Home Medications: 


Ambulatory Orders





Carisoprodol [Soma] 350 mg PO PRN PRN 09/02/15 


Amitriptyline HCl [Elavil] 100 mg PO DAILY 01/25/17 


Doxepin HCl [Sinequan] 25 mg PO BEDTIME 03/01/17 


Esomeprazole Magnesium [Nexium] 20 mg PO DAILY 05/16/17 


Promethazine HCl [Phenergan Tab] 25 mg PO Q6H PRN #15 tablet 05/24/17 


Amitriptyline HCl 25 mg PO TID 07/24/17

## 2017-08-14 ENCOUNTER — HOSPITAL ENCOUNTER (EMERGENCY)
Dept: HOSPITAL 58 - ED | Age: 47
Discharge: HOME | End: 2017-08-14

## 2017-08-14 VITALS — SYSTOLIC BLOOD PRESSURE: 130 MMHG | TEMPERATURE: 98 F | DIASTOLIC BLOOD PRESSURE: 95 MMHG

## 2017-08-14 VITALS — BODY MASS INDEX: 35.2 KG/M2

## 2017-08-14 DIAGNOSIS — G43.009: Primary | ICD-10-CM

## 2017-08-14 PROCEDURE — 99283 EMERGENCY DEPT VISIT LOW MDM: CPT

## 2017-08-14 PROCEDURE — 96372 THER/PROPH/DIAG INJ SC/IM: CPT

## 2017-08-14 NOTE — ED.PDOC
General


ED Provider: 


Dr. CHANTEL MEDELLIN





Chief Complaint: Headache


Stated Complaint: Patient is a 46 year old female who c/o migraine headaches. 

States has Pain to forehead which she describes as similar to prior migraines.  

Rates 8/10. Nausea. Light/noise sensitive.


Time Seen by Physician: 21:13


Mode of Arrival: Walk-In


Information Source: Patient


Primary Care Provider: 


MYRON LANGSTON





Nursing and Triage Documentation Reviewed and Agree: Yes





Review of Systems





- Review Of Systems


Constitutional: Reports: No symptoms


Eyes: Reports: Photophobia


Ears, Nose, Mouth, Throat: Reports: No symptoms


Respiratory: Reports: No symptoms


Cardiac: Reports: No symptoms


GI: Reports: Nausea, Poor appetite, Vomiting


: Reports: No symptoms


Musculoskeletal: Reports: No symptoms


Skin: Reports: No symptoms


Neurological: Reports: Anxiety, Headache


Endocrine: Reports: No symptoms


Hematologic/Lymphatic: Reports: No symptoms


All Other Systems: Reviewed and Negative





Past Medical History





- Past Medical History


Previously Healthy: Yes


Endocrine: Reports: Hypothyroid


Cardiovascular: Reports: Hypertension


Respiratory: Reports: COPD, Asthma


Hematological: Reports: None


Gastrointestinal: Reports: GERD (nexium ), Pancreatitis (stents with 

pancreatitis-chronic pancreatitis)


Genitourinary: Reports: None


Neuro/Psych: Reports: Migraine, Seizure, Anxiety.  Denies: Depression (

fluoxetine)


Musculoskeletal: Reports: Arthritis, Back Pain, Other (soma phenergan)


Cancer: Reports: None


Last Menstrual Period: 2010 surg - partial hysterectomy


Other Pertinent Past Medical History: Chronic back pain ,intrathecal pain pump 

and removal MEDIPORT





- Surgical History


General Surgical History: Reports: Hysterectomy (PARTIAL HYSTERECTOMY 2010), C-

section, Cholecystectomy, Tonsillectomy, Adenoidectomy, Orthopedic (bilat. feet 

surg), Other (stents with pancreatitis-chronic pancreatitis)





- Family History


Family History: Reports: None





- Social History


Smoking Status: Never smoker


Hx Substance Use: No


Alcohol Screening: Occasionally





- Immunizations


Tetanus Shot up to Date: Yes





Physical Exam





- Physical Exam


Appearance: Ill-appearing, Obese


Ill-appearing: Moderate


Pain Distress: Severe


Eyes: YEIMY, EOMI, Conjunctiva clear


ENT: Ears normal, Nose normal, Oropharynx normal


Respiratory: Airway patent, Breath sounds clear, Breath sounds equal, 

Respirations nonlabored


Cardiovascular: RRR, Pulses normal, No rub, No murmur


GI/: Soft, Nontender, No masses, Bowel sounds normal, No Organomegaly


Musculoskeletal: Normal strength, ROM intact, No edema, No calf tenderness


Skin: Warm, Dry, Normal color


Neurological: Sensation intact, Motor intact, Reflexes intact, Cranial nerves 

intact, Alert, Oriented


Psychiatric: Anxious





Critical Care Note





- Critical Care Note


Total Time (mins): 10





Course





- Course


Orders, Labs, Meds: 


Orders











 Category Date Time Status


 


 Butorphanol Tartrate [Stadol] MEDS  08/14/17 20:45 Discontinued





 2 mg IM ONCE STA   


 


 Promethazine HCl [Phenergan 25 mg/ml Vial] MEDS  08/14/17 20:45 Discontinued





 25 mg IM ONCE STA   








Medications














Discontinued Medications














Generic Name Dose Route Start Last Admin





  Trade Name Freq  PRN Reason Stop Dose Admin


 


Butorphanol Tartrate  2 mg  08/14/17 20:45  08/14/17 20:57





  Stadol  IM  08/14/17 20:46  2 mg





  ONCE STA   Administration


 


Promethazine HCl  25 mg  08/14/17 20:45  08/14/17 20:57





  Phenergan 25 Mg/Ml Vial  IM  08/14/17 20:46  25 mg





  ONCE STA   Administration











Vital Signs: 


 











  Temp Pulse Resp BP Pulse Ox


 


 08/14/17 20:10  98 F  99 H  20  130/95 H  94 L














Departure





- Departure


Time of Disposition: 21:30


Disposition: HOME SELF-CARE


Discharge Problem: 


Migraine


Qualifiers:


 Migraine type: without aura Status migrainosus presence: without status 

migrainosus Intractability: not intractable Qualifier Code: (G43.009) Migraine 

without aura, not intractable, without status migrainosus





Instructions:  Migraine Headache (ED)


Condition: Fair


Pt referred to PMD for follow-up: Yes


Additional Instructions: 


Take medications as prescribed 


Follow up with neurologist in 3 days 


rest 


Prescriptions: 


Promethazine HCl [Phenergan Tab] 25 mg PO Q6H PRN #15 tablet


 PRN Reason: Nausea / Vomiting


Zolmitriptan [Zomig] 5 mg PO Q6H PRN #14 tablet


 PRN Reason: Migrane headaches 


Allergies/Adverse Reactions: 


Allergies





divalproex sodium [From Depakote] Adverse Reaction (Verified 08/14/17 20:17)


 


ketorolac [From Toradol] Adverse Reaction (Verified 08/14/17 20:17)


 


metoclopramide HCl [From Reglan] Adverse Reaction (Verified 08/14/17 20:17)


 








Home Medications: 


Ambulatory Orders





Carisoprodol [Soma] 350 mg PO PRN PRN 09/02/15 


Amitriptyline HCl [Elavil] 100 mg PO DAILY 01/25/17 


Doxepin HCl [Sinequan] 25 mg PO BEDTIME 03/01/17 


Esomeprazole Magnesium [Nexium] 20 mg PO DAILY 05/16/17 


Promethazine HCl [Phenergan Tab] 25 mg PO Q6H PRN #15 tablet 05/24/17 


Promethazine HCl [Phenergan Tab] 25 mg PO Q6H PRN #15 tablet 08/14/17 


Zolmitriptan [Zomig] 5 mg PO Q6H PRN #14 tablet 08/14/17 








Disposition Discussed With: Patient, Family

## 2017-08-27 ENCOUNTER — HOSPITAL ENCOUNTER (EMERGENCY)
Dept: HOSPITAL 58 - ED | Age: 47
Discharge: HOME | End: 2017-08-27

## 2017-08-27 VITALS — DIASTOLIC BLOOD PRESSURE: 83 MMHG | SYSTOLIC BLOOD PRESSURE: 119 MMHG | TEMPERATURE: 98.3 F

## 2017-08-27 VITALS — BODY MASS INDEX: 33.7 KG/M2

## 2017-08-27 DIAGNOSIS — G43.909: Primary | ICD-10-CM

## 2017-08-27 PROCEDURE — 96372 THER/PROPH/DIAG INJ SC/IM: CPT

## 2017-08-27 PROCEDURE — 99282 EMERGENCY DEPT VISIT SF MDM: CPT

## 2017-08-27 NOTE — ED.PDOC
General


ED Provider: 


Dr. ARACELY CALABRESE JR





Chief Complaint: Headache


Stated Complaint: woke up with migraine headache this am--took imitrex but pain 

cont--usual migraine pain--has nausea and vomiting--sees neurologist on friday 

and waiting on md for pain management referral[ End ]98.3 105 16 95% 119/83 8/

10.  note normal CT Head April 20 2017


Time Seen by Physician: 17:37


Mode of Arrival: Walk-In


Information Source: Patient


Exam Limitations: No limitations


Primary Care Provider: 


MYRON LANGSTON





Nursing and Triage Documentation Reviewed and Agree: No





Review of Systems





- Review Of Systems


Constitutional: Reports: No symptoms


Eyes: Reports: Photophobia


Ears, Nose, Mouth, Throat: Reports: No symptoms


Respiratory: Reports: No symptoms


Cardiac: Reports: No symptoms


GI: Reports: No symptoms


: Reports: No symptoms


Musculoskeletal: Reports: No symptoms


Skin: Reports: No symptoms


Neurological: Reports: Headache


Endocrine: Reports: No symptoms


Hematologic/Lymphatic: Reports: No symptoms


All Other Systems: Other





Past Medical History





- Past Medical History


Previously Healthy: Yes


Endocrine: Reports: Hypothyroid


Cardiovascular: Reports: Hypertension


Respiratory: Reports: COPD, Asthma


Hematological: Reports: Anemia


Gastrointestinal: Reports: GERD (nexium ), Pancreatitis (stents with 

pancreatitis-chronic pancreatitis)


Genitourinary: Reports: None


Neuro/Psych: Reports: Migraine, Seizure, Anxiety.  Denies: Depression (

fluoxetine)


Musculoskeletal: Reports: Arthritis, Back Pain, Other (soma phenergan)


Cancer: Reports: None


Last Menstrual Period: hysterectomy


Other Pertinent Past Medical History: Chronic back pain ,intrathecal pain pump 

and removal MEDIPORT





- Surgical History


General Surgical History: Reports: Hysterectomy (PARTIAL HYSTERECTOMY 2010), C-

section, Cholecystectomy, Tonsillectomy, Adenoidectomy, Orthopedic (bilat. feet 

surg;RIGHT AND LEFT FOOT SURGERY), Other (stents with pancreatitis-chronic 

pancreatitis, MEDIPORT )





- Family History


Family History: Reports: None





- Social History


Smoking Status: Never smoker


Hx Substance Use: No


Alcohol Screening: Occasionally





- Immunizations


Tetanus Shot up to Date: Yes





Physical Exam





- Physical Exam


Appearance: Ill-appearing, Obese


Pain Distress: Moderate


Eyes: YEIMY, EOMI, Conjunctiva clear


ENT: Erythema (right ear tender on exam no othe rpain recc recheck next week)


Neck: Supple


Respiratory: Airway patent, Breath sounds clear, Breath sounds equal, 

Respirations nonlabored


Cardiovascular: RRR, Pulses normal, No rub, No murmur


GI/: Soft, Nontender, No masses, Bowel sounds normal, No Organomegaly


Musculoskeletal: Normal strength, ROM intact, No edema, No calf tenderness


Skin: Warm, Dry, Normal color


Neurological: Sensation intact, Motor intact, Reflexes intact, Cranial nerves 

intact, Alert, Oriented (does not know day of month)


Psychiatric: Affect appropriate, Mood appropriate





Re-Evaluation





- Re-Evaluation


Time of Re-Evaluation: 07:10


Status: Improved (patient much improved on discharge note to see neurologist 

this week [facility] follow up with PMD)





Critical Care Note





- Critical Care Note


Total Time (mins): 0





Course





- Course


Orders, Labs, Meds: 


Orders











 Category Date Time Status


 


 Butorphanol Tartrate [Stadol] MEDS  08/27/17 17:35 Discontinued





 2 mg IM ONCE STA   


 


 Promethazine HCl [Phenergan 25 mg/ml Vial] MEDS  08/27/17 17:35 Discontinued





 25 mg IM ONCE STA   








Medications














Discontinued Medications














Generic Name Dose Route Start Last Admin





  Trade Name Walterq  PRN Reason Stop Dose Admin


 


Butorphanol Tartrate  2 mg  08/27/17 17:35  08/27/17 17:45





  Stadol  IM  08/27/17 17:36  2 mg





  ONCE STA   Administration


 


Promethazine HCl  25 mg  08/27/17 17:35  08/27/17 17:45





  Phenergan 25 Mg/Ml Vial  IM  08/27/17 17:36  25 mg





  ONCE STA   Administration











Vital Signs: 


 











  Temp Pulse Resp BP Pulse Ox


 


 08/27/17 17:21  98.3 F  105 H  16  119/83  95














Departure





- Departure


Time of Disposition: 17:43


Disposition: HOME SELF-CARE


Discharge Problem: 


 Headache





Instructions:  Migraine Headache (ED)


Condition: Good


Pt referred to PMD for follow-up: Yes


Additional Instructions: 


DISCUSS STADOL TREATMENT WITH NEUROLOGIST


RETURN IF UNABLE TO KEEP LIQUIDS DOWN


CLEAR LIQUIDS FOR 4-8 HOURS AFTER NAUSEA


RECHECK PMD THIS WEEK


Allergies/Adverse Reactions: 


Allergies





divalproex sodium [From Depakote] Adverse Reaction (Verified 08/27/17 17:27)


 


ketorolac [From Toradol] Adverse Reaction (Verified 08/27/17 17:27)


 


metoclopramide HCl [From Reglan] Adverse Reaction (Verified 08/27/17 17:27)


 








Home Medications: 


Ambulatory Orders





Carisoprodol [Soma] 350 mg PO PRN PRN 09/02/15 


Amitriptyline HCl [Elavil] 100 mg PO DAILY 01/25/17 


Doxepin HCl [Sinequan] 25 mg PO BEDTIME 03/01/17 


Esomeprazole Magnesium [Nexium] 20 mg PO DAILY 05/16/17 


Promethazine HCl [Phenergan Tab] 25 mg PO Q6H PRN #15 tablet 08/14/17 


Zolmitriptan [Zomig] 5 mg PO Q6H PRN #14 tablet 08/14/17

## 2017-09-08 ENCOUNTER — HOSPITAL ENCOUNTER (EMERGENCY)
Dept: HOSPITAL 58 - ED | Age: 47
Discharge: HOME | End: 2017-09-08

## 2017-09-08 VITALS — SYSTOLIC BLOOD PRESSURE: 121 MMHG | DIASTOLIC BLOOD PRESSURE: 80 MMHG | TEMPERATURE: 97.9 F

## 2017-09-08 VITALS — BODY MASS INDEX: 33.7 KG/M2

## 2017-09-08 DIAGNOSIS — G43.009: Primary | ICD-10-CM

## 2017-09-08 PROCEDURE — 99283 EMERGENCY DEPT VISIT LOW MDM: CPT

## 2017-09-08 PROCEDURE — 96372 THER/PROPH/DIAG INJ SC/IM: CPT

## 2017-09-08 NOTE — ED.PDOC
General


ED Provider: 


Dr. ALVARADO BROOKS





Chief Complaint: Headache


Stated Complaint: Migraine headache x 5 days. HA is at temples and behind her 

eyes. Light, sound and smells worsen pain. Nausea and vomiting intermittently. 

Imitrex tried without relief. Lifelong hx of migraines.


Time Seen by Physician: 12:56


Mode of Arrival: Walk-In


Information Source: Patient


Exam Limitations: No limitations


Primary Care Provider: 


MYRON LANGSTON





Nursing and Triage Documentation Reviewed and Agree: Yes





Neurological Complaint Exam





- Headache Complaint/Exam


Duration: 5 days


Symptoms Are: Still present


Timing: Constant


Episodes Lasting: Days


Worst Headache Ever: No


Initial Severity: Moderate


Current Severity: Severe


Location: Right, Left, Temporal


Character: Reports: Throbbing, Migraine


Aggravating: Reports: Bright lights (smells, sounds also worsen pain)


Alleviating: Reports: None


Associated Signs and Symptoms: Reports: Nausea, Vomiting


Related History: Reports: Similar episode (previous migraine HAs with same sx)


Related Surgical History: Reports: None


SAH Risk Factors: Reports: None


Meningitis Risk Factors: Reports: None


SDH Risk Factors: Reports: None


Temporal Arteritis Risk Factors: Reports: Female, 


Normal Head CT Within Last 12 Months: No


Fundoscopic Exam: Present: Normal Findings


Papilledema Present: No


Temporal Artery Tenderness: Present: None


Sinus Tenderness: Present: None


TMJ Tenderness: Present: None


Meningeal Signs Positive: No


Pain on Passive Flexion-Positive Kernig's: No


ROM Limited In: No Limitiations


Focal Weakness: Present: None


Focal Sensory Loss: Present: None


Gait: Normal


Nystagmus Present: No


Gag Reflex Present: Yes


Finger-to-Nose: Normal Findings


Romberg Test Positive: No


Babinski Sign: Negative Right, Negative Left


Heel to Toe Normal: Yes


Differential Diagnoses: Migraine, Tension Headache





Review of Systems





- Review Of Systems


Constitutional: Reports: No symptoms


Eyes: Reports: Photophobia


Ears, Nose, Mouth, Throat: Reports: No symptoms


Respiratory: Reports: No symptoms


Cardiac: Reports: No symptoms


GI: Reports: Nausea, Vomiting


: Reports: No symptoms


Musculoskeletal: Reports: No symptoms


Skin: Reports: No symptoms


Neurological: Reports: Headache


Endocrine: Reports: No symptoms


Hematologic/Lymphatic: Reports: No symptoms


All Other Systems: Reviewed and Negative





Past Medical History





- Past Medical History


Previously Healthy: Yes


Endocrine: Reports: Hypothyroid


Cardiovascular: Reports: Hypertension


Respiratory: Reports: COPD, Asthma


Hematological: Reports: Anemia


Gastrointestinal: Reports: GERD (nexium ), Pancreatitis (stents with 

pancreatitis-chronic pancreatitis)


Genitourinary: Reports: None


Neuro/Psych: Reports: Migraine, Seizure, Anxiety.  Denies: Depression (

fluoxetine)


Musculoskeletal: Reports: Arthritis, Back Pain, Other (soma phenergan)


Cancer: Reports: None


Last Menstrual Period: hysterectomy


Other Pertinent Past Medical History: Chronic back pain ,intrathecal pain pump 

and removal MEDIPORT





- Surgical History


General Surgical History: Reports: Hysterectomy (PARTIAL HYSTERECTOMY 2010), C-

section, Cholecystectomy, Tonsillectomy, Adenoidectomy, Orthopedic (bilat. feet 

surg;RIGHT AND LEFT FOOT SURGERY), Other (stents with pancreatitis-chronic 

pancreatitis, MEDIPORT )





- Family History


Family History: Reports: None





- Social History


Smoking Status: Never smoker


Hx Substance Use: No


Alcohol Screening: Occasionally





Physical Exam





- Physical Exam


Appearance: Well-appearing, Well-nourished, Obese


Ill-appearing: None


Pain Distress: Moderate


Eyes: YEIMY, EOMI, Conjunctiva clear


ENT: Ears normal, Nose normal, Oropharynx normal


Neck: Supple


Respiratory: Airway patent, Breath sounds clear, Breath sounds equal, 

Respirations nonlabored


Cardiovascular: RRR, Pulses normal, No rub, No murmur


GI/: Soft, Nontender, No masses, Bowel sounds normal, No Organomegaly


Musculoskeletal: Normal strength, ROM intact, No edema, No calf tenderness


Skin: Warm, Dry, Normal color


Neurological: Sensation intact, Motor intact, Reflexes intact, Cranial nerves 

intact, Alert, Oriented


Psychiatric: Affect appropriate, Mood appropriate





Re-Evaluation





- Re-Evaluation


Time of Re-Evaluation: 13:45


Status: Improved


Vital Signs Stable: Yes


Pain Level: 0


Appearance: NAD


Lungs: Clear


Skin: Warm and Dry


Neuro: Alert and Oriented X3


CV: RRR





Critical Care Note





- Critical Care Note


Total Time (mins): 0





Course





- Course


Orders, Labs, Meds: 


Orders











 Category Date Time Status


 


 Butorphanol Tartrate [Stadol] MEDS  09/08/17 13:07 Discontinued





 2 mg IM ONCE STA   


 


 Diphenhydramine Inj [Benadryl] MEDS  09/08/17 13:08 Discontinued





 25 mg IM ONCE STA   


 


 Promethazine HCl [Phenergan 25 mg/ml Vial] MEDS  09/08/17 13:07 Discontinued





 25 mg IM ONCE STA   








Medications














Discontinued Medications














Generic Name Dose Route Start Last Admin





  Trade Name Freq  PRN Reason Stop Dose Admin


 


Butorphanol Tartrate  2 mg  09/08/17 13:07  09/08/17 13:20





  Stadol  IM  09/08/17 13:08  2 mg





  ONCE STA   Administration


 


Diphenhydramine HCl  25 mg  09/08/17 13:08  09/08/17 13:19





  Benadryl  IM  09/08/17 13:09  25 mg





  ONCE STA   Administration


 


Promethazine HCl  25 mg  09/08/17 13:07  09/08/17 13:20





  Phenergan 25 Mg/Ml Vial  IM  09/08/17 13:08  25 mg





  ONCE STA   Administration











Vital Signs: 


 











  Temp Pulse Resp BP Pulse Ox


 


 09/08/17 12:24  97.9 F  98 H  16  121/80  93 L














Departure





- Departure


Time of Disposition: 13:55


Disposition: HOME SELF-CARE


Discharge Problem: 


 Migraine headache without aura





Instructions:  Migraine Headache (ED)


Condition: Good


Pt referred to PMD for follow-up: No (see PCP if sx recur.)


Additional Instructions: 


please schedule follow up appointment with physician as soon as possible


Allergies/Adverse Reactions: 


Allergies





divalproex sodium [From Depakote] Adverse Reaction (Verified 09/08/17 12:30)


 


ketorolac [From Toradol] Adverse Reaction (Verified 09/08/17 12:30)


 


metoclopramide HCl [From Reglan] Adverse Reaction (Verified 09/08/17 12:30)


 








Home Medications: 


Ambulatory Orders





Carisoprodol [Soma] 350 mg PO PRN PRN 09/02/15 


Amitriptyline HCl [Elavil] 100 mg PO DAILY 01/25/17 


Doxepin HCl [Sinequan] 25 mg PO BEDTIME 03/01/17 


Esomeprazole Magnesium [Nexium] 20 mg PO DAILY 05/16/17 


Promethazine HCl [Phenergan Tab] 25 mg PO Q6H PRN #15 tablet 08/14/17

## 2017-09-17 ENCOUNTER — HOSPITAL ENCOUNTER (EMERGENCY)
Dept: HOSPITAL 58 - ED | Age: 47
Discharge: HOME | End: 2017-09-17

## 2017-09-17 VITALS — TEMPERATURE: 98.6 F | DIASTOLIC BLOOD PRESSURE: 82 MMHG | SYSTOLIC BLOOD PRESSURE: 144 MMHG

## 2017-09-17 VITALS — BODY MASS INDEX: 33 KG/M2

## 2017-09-17 DIAGNOSIS — G43.909: Primary | ICD-10-CM

## 2017-09-17 LAB
ADD URINE MICROSCOPIC: NO
PH UR: 5.5 [PH] (ref 5–9)
SP GR UR: <=1.005 (ref 1–1.03)

## 2017-09-17 PROCEDURE — 96372 THER/PROPH/DIAG INJ SC/IM: CPT

## 2017-09-17 PROCEDURE — 99283 EMERGENCY DEPT VISIT LOW MDM: CPT

## 2017-09-17 PROCEDURE — 81001 URINALYSIS AUTO W/SCOPE: CPT

## 2017-09-17 NOTE — ED.PDOC
General


ED Provider: 


Dr. ALE MANLEY





Chief Complaint: Headache


Stated Complaint: headache


Time Seen by Physician: 15:18 (seen withs staff)


Mode of Arrival: Walk-In


Information Source: Patient


Exam Limitations: No limitations


Primary Care Provider: 


MYRON LANGSTON





Nursing and Triage Documentation Reviewed and Agree: Yes





Neurological Complaint Exam





- Headache Complaint/Exam


Onset: Gradual


Duration: 1 day


Symptoms Are: Still present


Timing: Constant


Episodes Lasting: Hours


Worst Headache Ever: No


Initial Severity: Moderate


Current Severity: Moderate


Location: Left, Frontal, Temporal


Character: Reports: Dull


Aggravating: Reports: None


Alleviating: Reports: None


Associated Signs and Symptoms: Denies: Dizziness, Seizure, Nausea, Vomiting, 

Sinus pressure, Fever, Neck pain, Neck stiffness, Decreased LOC, Visual changes


Related History: Reports: Similar episode


Related Surgical History: Reports: None


SAH Risk Factors: Reports: None


Meningitis Risk Factors: Reports: None


SDH Risk Factors: Reports: None


Temporal Arteritis Risk Factors: Reports: Female, 


Normal Head CT Within Last 12 Months: Yes


Fundoscopic Exam: Present: Normal Findings


Papilledema Present: No


Temporal Artery Tenderness: Present: None


Sinus Tenderness: Present: None


TMJ Tenderness: Present: None


Glascow Coma Scale (see protocol): 15


Meningeal Signs Positive: No


Pain on Passive Flexion-Positive Kernig's: No


ROM Limited In: No Limitiations


Focal Weakness: Present: None


Focal Sensory Loss: Present: None


Gait: Normal


Nystagmus Present: No


Gag Reflex Present: Yes


Differential Diagnoses: Migraine





Review of Systems





- Review Of Systems


Constitutional: Reports: No symptoms


Eyes: Reports: No symptoms


Ears, Nose, Mouth, Throat: Reports: No symptoms


Respiratory: Reports: No symptoms


Cardiac: Reports: No symptoms


GI: Reports: No symptoms


: Reports: No symptoms


Musculoskeletal: Reports: No symptoms


Skin: Reports: No symptoms


Neurological: Reports: Headache


Endocrine: Reports: No symptoms


Hematologic/Lymphatic: Reports: No symptoms


All Other Systems: Reviewed and Negative





Past Medical History





- Past Medical History


Previously Healthy: Yes


Endocrine: Reports: Hypothyroid


Cardiovascular: Reports: Hypertension


Respiratory: Reports: COPD, Asthma


Hematological: Reports: Anemia


Gastrointestinal: Reports: GERD (nexium ), Pancreatitis (stents with 

pancreatitis-chronic pancreatitis)


Genitourinary: Reports: None


Neuro/Psych: Reports: Migraine, Seizure, Anxiety.  Denies: Depression (

fluoxetine)


Musculoskeletal: Reports: Arthritis, Back Pain, Other (soma phenergan)


Cancer: Reports: None


Last Menstrual Period: none


Other Pertinent Past Medical History: Chronic back pain ,intrathecal pain pump 

and removal MEDIPORT





- Surgical History


General Surgical History: Reports: Hysterectomy (PARTIAL HYSTERECTOMY 2010), C-

section, Cholecystectomy, Tonsillectomy, Adenoidectomy, Orthopedic (bilat. feet 

surg;RIGHT AND LEFT FOOT SURGERY), Other (stents with pancreatitis-chronic 

pancreatitis, MEDIPORT )





- Family History


Family History: Reports: None





- Social History


Smoking Status: Never smoker


Hx Substance Use: No


Alcohol Screening: Occasionally





Physical Exam





- Physical Exam


Appearance: Well-appearing, No pain distress, Well-nourished


Eyes: YEIMY, EOMI, Conjunctiva clear


ENT: Ears normal, Nose normal, Oropharynx normal


Respiratory: Rhonchi


Cardiovascular: RRR, Pulses normal, No rub, No murmur


GI/: Soft, Nontender, No masses, Bowel sounds normal, No Organomegaly


Musculoskeletal: Normal strength, ROM intact, No edema, No calf tenderness


Skin: Warm, Dry, Normal color


Neurological: Sensation intact, Motor intact, Reflexes intact, Cranial nerves 

intact, Alert, Oriented


Psychiatric: Affect appropriate, Mood appropriate





Critical Care Note





- Critical Care Note


Total Time (mins): 0





Course





- Course


Orders, Labs, Meds: 





Orders











 Category Date Time Status


 


 UA [URINALYSIS C & S IF INDICATED] Stat LAB  09/17/17 15:57 Uncollected


 


 Morphine Sulfate [Morphine 2 mg/ml Syringe] MEDS  09/17/17 15:56 Stat





 2 mg IM ONCE STA   


 


 Ondansetron HCl/Pf [Zofran 4 mg/2 ml] MEDS  09/17/17 15:54 Stop Req





 4 mg IM ONCE STA   


 


 Promethazine HCl [Phenergan 25 mg/ml Vial] MEDS  09/17/17 15:57 Stat





 25 mg IM ONCE STA   








Medications











Generic Name Dose Route Start Last Admin





  Trade Name Freq  PRN Reason Stop Dose Admin


 


Promethazine HCl  25 mg  09/17/17 15:57  





  Phenergan 25 Mg/Ml Vial  IM  09/17/17 15:58  





  ONCE STA   














Discontinued Medications














Generic Name Dose Route Start Last Admin





  Trade Name Freq  PRN Reason Stop Dose Admin


 


Morphine Sulfate  2 mg  09/17/17 15:56  





  Morphine 2 Mg/Ml Syringe  IM  09/17/17 15:57  





  ONCE STA   











Vital Signs: 





 











  Temp Pulse Resp BP Pulse Ox


 


 09/17/17 15:14  98.6 F  101 H  20  144/82 H  96














Departure





- Departure


Time of Disposition: 16:00


Disposition: HOME SELF-CARE


Discharge Problem: 


 Headache





Migraine


Qualifiers:


 Migraine type: unspecified Status migrainosus presence: without status 

migrainosus Intractability: not intractable Qualified Code(s): G43.909 - 

Migraine, unspecified, not intractable, without status migrainosus





Instructions:  Acute Headache (ED)


Condition: Good


Pt referred to PMD for follow-up: Yes


Additional Instructions: 


Please call your Family Physician as soon as possible to schedule a follow-up 

appointment.


Allergies/Adverse Reactions: 


Allergies





divalproex sodium [From Depakote] Adverse Reaction (Verified 09/17/17 15:18)


 


ketorolac [From Toradol] Adverse Reaction (Verified 09/17/17 15:18)


 


metoclopramide HCl [From Reglan] Adverse Reaction (Verified 09/17/17 15:18)


 








Home Medications: 


Ambulatory Orders





Carisoprodol [Soma] 350 mg PO PRN PRN 09/02/15 


Amitriptyline HCl [Elavil] 100 mg PO DAILY 01/25/17 


Doxepin HCl [Sinequan] 25 mg PO BEDTIME 03/01/17 


Esomeprazole Magnesium [Nexium] 20 mg PO DAILY 05/16/17 


Promethazine HCl [Phenergan Tab] 25 mg PO Q6H PRN #15 tablet 08/14/17

## 2017-09-18 ENCOUNTER — HOSPITAL ENCOUNTER (EMERGENCY)
Dept: HOSPITAL 58 - ED | Age: 47
Discharge: HOME | End: 2017-09-18

## 2017-09-18 VITALS — TEMPERATURE: 98.1 F | SYSTOLIC BLOOD PRESSURE: 138 MMHG | DIASTOLIC BLOOD PRESSURE: 87 MMHG

## 2017-09-18 VITALS — BODY MASS INDEX: 33 KG/M2

## 2017-09-18 DIAGNOSIS — G43.019: Primary | ICD-10-CM

## 2017-09-18 PROCEDURE — 96372 THER/PROPH/DIAG INJ SC/IM: CPT

## 2017-09-18 PROCEDURE — 99283 EMERGENCY DEPT VISIT LOW MDM: CPT

## 2017-09-18 NOTE — ED.PDOC
General


ED Provider: 


Dr. CHE EISENBERG





Chief Complaint: Headache


Stated Complaint: Patient was here yesterday for the same reason, says she has 

the headache today, needs help


Time Seen by Physician: 20:18


Mode of Arrival: Walk-In


Information Source: Patient


Primary Care Provider: 


MYRON LANGSTON





Nursing and Triage Documentation Reviewed and Agree: Yes





Neurological Complaint Exam





- Headache Complaint/Exam


Onset: Gradual


Symptoms Are: Still present


Timing: Constant


Episodes Lasting: Hours


Worst Headache Ever: No


Initial Severity: Moderate


Current Severity: Moderate


Location: Right, Left, Frontal


Character: Reports: Dull, Throbbing, Migraine


Aggravating: Reports: Bright lights


Alleviating: Reports: None


Associated Signs and Symptoms: Denies: Dizziness, Seizure, Nausea, Vomiting, 

Sinus pressure, Fever, Neck pain, Neck stiffness, Decreased LOC, Visual changes


Related History: Reports: Similar episode


Related Surgical History: Reports: None


SAH Risk Factors: Reports: None


Meningitis Risk Factors: Reports: None


SDH Risk Factors: Reports: None


Temporal Arteritis Risk Factors: Reports: None


Normal Head CT Within Last 12 Months: Yes


Temporal Artery Tenderness: Present: None


Sinus Tenderness: Present: None


TMJ Tenderness: Present: None


Meningeal Signs Positive: No


Pain on Passive Flexion-Positive Kernig's: No


ROM Limited In: No Limitiations


Focal Weakness: Present: None


Focal Sensory Loss: Present: None


Gait: Normal


Nystagmus Present: No


Gag Reflex Present: No


Finger-to-Nose: Normal Findings


Romberg Test Positive: No


Babinski Sign: Negative Right, Negative Left


Heel to Toe Normal: No


Differential Diagnoses: Migraine





Review of Systems





- Review Of Systems


Constitutional: Reports: No symptoms


Eyes: Reports: No symptoms


Ears, Nose, Mouth, Throat: Reports: No symptoms


Respiratory: Reports: No symptoms


Cardiac: Reports: No symptoms


GI: Reports: No symptoms


: Reports: No symptoms


Musculoskeletal: Reports: No symptoms


Skin: Reports: No symptoms


Neurological: Reports: Headache


Endocrine: Reports: No symptoms


Hematologic/Lymphatic: Reports: No symptoms


All Other Systems: Reviewed and Negative





Past Medical History





- Past Medical History


Previously Healthy: Yes


Endocrine: Reports: Hypothyroid


Cardiovascular: Reports: Hypertension


Respiratory: Reports: COPD, Asthma


Hematological: Reports: Anemia


Gastrointestinal: Reports: GERD (nexium ), Pancreatitis (stents with 

pancreatitis-chronic pancreatitis)


Genitourinary: Reports: None


Neuro/Psych: Reports: Migraine, Seizure, Anxiety.  Denies: Depression (

fluoxetine)


Musculoskeletal: Reports: Arthritis, Back Pain, Other (soma phenergan)


Cancer: Reports: None


Last Menstrual Period: na


Other Pertinent Past Medical History: Chronic back pain ,intrathecal pain pump 

and removal MEDIPORT





- Surgical History


General Surgical History: Reports: Hysterectomy (PARTIAL HYSTERECTOMY 2010), C-

section, Cholecystectomy, Tonsillectomy, Adenoidectomy, Orthopedic (bilat. feet 

surg;RIGHT AND LEFT FOOT SURGERY), Other (stents with pancreatitis-chronic 

pancreatitis, MEDIPORT )





- Family History


Family History: Reports: None





- Social History


Smoking Status: Never smoker


Hx Substance Use: No


Alcohol Screening: Occasionally





- Immunizations


Tetanus Shot up to Date: Yes





Physical Exam





- Physical Exam


Appearance: Ill-appearing, Obese


Eyes: YEIMY, EOMI


ENT: Ears normal, Nose normal, Oropharynx normal


Respiratory: Airway patent, Breath sounds clear, Breath sounds equal, 

Respirations nonlabored


Cardiovascular: RRR, Pulses normal, No rub, No murmur


GI/: Soft, Nontender, No masses, Bowel sounds normal, No Organomegaly


Musculoskeletal: Normal strength, ROM intact, No edema, No calf tenderness


Skin: Warm, Dry, Normal color


Neurological: Sensation intact, Motor intact, Reflexes intact, Cranial nerves 

intact, Alert, Oriented


Psychiatric: Affect appropriate, Mood appropriate





Critical Care Note





- Critical Care Note


Total Time (mins): 0





Course





- Course


Orders, Labs, Meds: 





Orders











 Category Date Time Status


 


 Morphine Sulfate [Morphine 2 mg/ml Syringe] MEDS  09/18/17 20:16 Stat





 2 mg IM ONCE STA   


 


 Promethazine HCl [Phenergan 25 mg/ml Vial] MEDS  09/18/17 20:16 Stat





 25 mg IM ONCE STA   








Medications














Discontinued Medications














Generic Name Dose Route Start Last Admin





  Trade Name Walterq  PRN Reason Stop Dose Admin


 


Morphine Sulfate  2 mg  09/18/17 20:16  





  Morphine 2 Mg/Ml Syringe  IM  09/18/17 20:17  





  ONCE STA   


 


Promethazine HCl  25 mg  09/18/17 20:16  





  Phenergan 25 Mg/Ml Vial  IM  09/18/17 20:17  





  ONCE STA   











Vital Signs: 





 











  Temp Pulse Resp BP Pulse Ox


 


 09/18/17 19:47  98.1 F  110 H  18  138/87  95














Departure





- Departure


Time of Disposition: 20:22


Disposition: HOME SELF-CARE


Discharge Problem: 


Migraine


Qualifiers:


 Migraine type: without aura Status migrainosus presence: without status 

migrainosus Intractability: intractable Qualified Code(s): G43.019 - Migraine 

without aura, intractable, without status migrainosus





Instructions:  Migraine Headache (ED)


Condition: Good


Pt referred to PMD for follow-up: Yes


Additional Instructions: 


needs f/u with neuro.


f/u at [facility] 





Allergies/Adverse Reactions: 


Allergies





divalproex sodium [From Depakote] Adverse Reaction (Verified 09/17/17 15:18)


 


ketorolac [From Toradol] Adverse Reaction (Verified 09/17/17 15:18)


 


metoclopramide HCl [From Reglan] Adverse Reaction (Verified 09/17/17 15:18)


 








Home Medications: 


Ambulatory Orders





Carisoprodol [Soma] 350 mg PO PRN PRN 09/02/15 


Amitriptyline HCl [Elavil] 100 mg PO DAILY 01/25/17 


Doxepin HCl [Sinequan] 25 mg PO BEDTIME 03/01/17 


Esomeprazole Magnesium [Nexium] 20 mg PO DAILY 05/16/17 


Promethazine HCl [Phenergan Tab] 25 mg PO Q6H PRN #15 tablet 08/14/17 








Disposition Discussed With: Patient, Family

## 2017-11-12 ENCOUNTER — HOSPITAL ENCOUNTER (EMERGENCY)
Dept: HOSPITAL 58 - ED | Age: 47
Discharge: HOME | End: 2017-11-12

## 2017-11-12 VITALS — DIASTOLIC BLOOD PRESSURE: 115 MMHG | SYSTOLIC BLOOD PRESSURE: 169 MMHG | TEMPERATURE: 97.8 F

## 2017-11-12 VITALS — BODY MASS INDEX: 33.7 KG/M2

## 2017-11-12 DIAGNOSIS — G43.909: Primary | ICD-10-CM

## 2017-11-12 PROCEDURE — 99283 EMERGENCY DEPT VISIT LOW MDM: CPT

## 2017-11-12 PROCEDURE — 96372 THER/PROPH/DIAG INJ SC/IM: CPT

## 2017-11-12 NOTE — ED.PDOC
General


ED Provider: 


Dr. ALE MANLEY





Chief Complaint: Headache


Stated Complaint: headache


Time Seen by Physician: 13:33


Mode of Arrival: Walk-In


Information Source: Patient


Exam Limitations: No limitations


Primary Care Provider: 


MYRON LANGSTON





Nursing and Triage Documentation Reviewed and Agree: Yes





Neurological Complaint Exam





- Headache Complaint/Exam


Onset: Gradual


Duration: today


Symptoms Are: Still present


Timing: Constant


Episodes Lasting: Hours


Worst Headache Ever: No


Initial Severity: Moderate


Current Severity: Moderate


Location: Right, Frontal, Temporal


Character: Reports: Dull


Aggravating: Reports: None


Alleviating: Reports: None


Associated Signs and Symptoms: Denies: Dizziness, Seizure, Nausea, Vomiting, 

Sinus pressure, Fever, Neck pain, Neck stiffness, Decreased LOC, Visual changes


Related History: Reports: Similar episode


Related Surgical History: Reports: None


SAH Risk Factors: Reports: None


Meningitis Risk Factors: Reports: None


SDH Risk Factors: Reports: None


Temporal Arteritis Risk Factors: Reports: Female, 


Normal Head CT Within Last 12 Months: Yes


Fundoscopic Exam: Present: Normal Findings


Papilledema Present: No


Temporal Artery Tenderness: Present: None


Sinus Tenderness: Present: None


TMJ Tenderness: Present: None


Glascow Coma Scale (see protocol): 15


Meningeal Signs Positive: No


Pain on Passive Flexion-Positive Kernig's: No


ROM Limited In: No Limitiations


Focal Weakness: Present: None


Focal Sensory Loss: Present: None


Gait: Normal


Nystagmus Present: No


Gag Reflex Present: Yes


Finger-to-Nose: Normal Findings


Babinski Sign: Negative Right, Negative Left


Differential Diagnoses: Migraine





Review of Systems





- Review Of Systems


Constitutional: Reports: No symptoms


Eyes: Reports: No symptoms


Ears, Nose, Mouth, Throat: Reports: No symptoms


Respiratory: Reports: No symptoms


Cardiac: Reports: No symptoms


GI: Reports: No symptoms


: Reports: No symptoms


Musculoskeletal: Reports: No symptoms


Skin: Reports: No symptoms


Neurological: Reports: Headache


Endocrine: Reports: No symptoms


Hematologic/Lymphatic: Reports: No symptoms


All Other Systems: Reviewed and Negative





Past Medical History





- Past Medical History


Previously Healthy: Yes


Endocrine: Reports: Hypothyroid


Cardiovascular: Reports: Hypertension


Respiratory: Reports: COPD, Asthma


Hematological: Reports: Anemia


Gastrointestinal: Reports: GERD (nexium ), Pancreatitis (stents with 

pancreatitis-chronic pancreatitis)


Genitourinary: Reports: None


Neuro/Psych: Reports: Migraine, Seizure, Anxiety.  Denies: Depression (

fluoxetine)


Musculoskeletal: Reports: Arthritis, Back Pain, Other (soma phenergan)


Cancer: Reports: None


Last Menstrual Period: HSYTERECOMY


Other Pertinent Past Medical History: Chronic back pain ,intrathecal pain pump 

and removal MEDIPORT





- Surgical History


General Surgical History: Reports: Hysterectomy (PARTIAL HYSTERECTOMY 2010), C-

section, Cholecystectomy, Tonsillectomy, Adenoidectomy, Orthopedic (bilat. feet 

surg;RIGHT AND LEFT FOOT SURGERY), Other (stents with pancreatitis-chronic 

pancreatitis, MEDIPORT )





- Family History


Family History: Reports: None





- Social History


Smoking Status: Never smoker


Hx Substance Use: No


Alcohol Screening: Occasionally





- Immunizations


Tetanus Shot up to Date: No


Influenza Vaccine within 12 Months: No


Pneumococcal Vaccine up to Date: No





Physical Exam





- Physical Exam


Appearance: Well-appearing, No pain distress, Well-nourished


Eyes: YEIMY, EOMI, Conjunctiva clear


ENT: Ears normal, Nose normal, Oropharynx normal


Respiratory: Airway patent, Breath sounds clear, Breath sounds equal, 

Respirations nonlabored


Cardiovascular: RRR, Pulses normal, No rub, No murmur


GI/: Soft, Nontender, No masses, Bowel sounds normal, No Organomegaly


Musculoskeletal: Normal strength, ROM intact, No edema, No calf tenderness


Skin: Warm, Dry, Normal color


Neurological: Sensation intact, Motor intact, Reflexes intact, Cranial nerves 

intact, Alert, Oriented


Psychiatric: Affect appropriate, Mood appropriate





Critical Care Note





- Critical Care Note


Total Time (mins): 0





Course





- Course


Orders, Labs, Meds: 





Orders











 Category Date Time Status


 


 Morphine Sulfate [Morphine 2 mg/ml Syringe] MEDS  11/12/17 13:54 Stat





 4 mg IM ONCE STA   


 


 Ondansetron HCl/Pf [Zofran 4 mg/2 ml] MEDS  11/12/17 13:52 Stat





 4 mg IM ONCE STA   








Medications














Discontinued Medications














Generic Name Dose Route Start Last Admin





  Trade Name Freq  PRN Reason Stop Dose Admin


 


Morphine Sulfate  4 mg  11/12/17 13:54  





  Morphine 2 Mg/Ml Syringe  IM  11/12/17 13:55  





  ONCE STA   


 


Ondansetron HCl  4 mg  11/12/17 13:52  





  Zofran 4 Mg/2 Ml  IM  11/12/17 13:53  





  ONCE STA   











Vital Signs: 





 











  Temp Pulse Resp BP Pulse Ox


 


 11/12/17 13:33  97.8 F  110 H  18  169/115 H  95














Departure





- Departure


Time of Disposition: 13:57


Disposition: HOME SELF-CARE


Discharge Problem: 


 Headache





Migraine


Qualifiers:


 Migraine type: unspecified Status migrainosus presence: without status 

migrainosus Intractability: not intractable Qualified Code(s): G43.909 - 

Migraine, unspecified, not intractable, without status migrainosus





Instructions:  Migraine Headache (ED), General Headache (ED)


Condition: Good


Pt referred to PMD for follow-up: Yes


Allergies/Adverse Reactions: 


Allergies





divalproex sodium [From Depakote] Adverse Reaction (Verified 11/12/17 13:36)


 


ketorolac [From Toradol] Adverse Reaction (Verified 11/12/17 13:36)


 


metoclopramide HCl [From Reglan] Adverse Reaction (Verified 11/12/17 13:36)


 








Home Medications: 


Ambulatory Orders





Carisoprodol [Soma] 350 mg PO PRN PRN 09/02/15 


Amitriptyline HCl [Elavil] 100 mg PO DAILY 01/25/17 


Doxepin HCl [Sinequan] 25 mg PO BEDTIME 03/01/17 


Esomeprazole Magnesium [Nexium] 20 mg PO DAILY 05/16/17 


Promethazine HCl [Phenergan Tab] 25 mg PO Q6H PRN #15 tablet 08/14/17

## 2017-11-26 ENCOUNTER — HOSPITAL ENCOUNTER (EMERGENCY)
Dept: HOSPITAL 58 - ED | Age: 47
Discharge: HOME | End: 2017-11-26

## 2017-11-26 VITALS — SYSTOLIC BLOOD PRESSURE: 146 MMHG | TEMPERATURE: 98.7 F | DIASTOLIC BLOOD PRESSURE: 86 MMHG

## 2017-11-26 VITALS — BODY MASS INDEX: 34.1 KG/M2
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
## 2017-11-26 DIAGNOSIS — S61.210A: Primary | ICD-10-CM

## 2017-11-26 PROCEDURE — 90471 IMMUNIZATION ADMIN: CPT

## 2017-11-26 PROCEDURE — 90715 TDAP VACCINE 7 YRS/> IM: CPT

## 2017-11-26 PROCEDURE — 99283 EMERGENCY DEPT VISIT LOW MDM: CPT

## 2017-11-26 NOTE — ED.PDOC
General


ED Provider: 


Dr. CHE EISENBERG





Chief Complaint: Finger Laceration


Stated Complaint: Had cut on the back of the Index finger, it finally stopped 

bleeding now.  did not had Tetanus in long time.


Time Seen by Physician: 12:06


Mode of Arrival: Walk-In


Information Source: Patient


Primary Care Provider: 


MYRON LANGSTON





Nursing and Triage Documentation Reviewed and Agree: Yes





Skin Complaint Exam





- Laceration/Upper Ext. Complaint/Exam


Location of Injury: Right (index finger)


Mechanism of Injury: Laceration (superficial.)


Symptoms Are: Still present


Initial Severity: Mild


Current Severity: Mild


Aggravating: Movement


Alleviating: None


Associated Signs and Symptoms: Denies: Fever, Chills, Erythema, Numbness, 

Tingling


Differential Diagnoses: Laceration





Review of Systems





- Review Of Systems


Constitutional: Reports: No symptoms


Eyes: Reports: No symptoms


Ears, Nose, Mouth, Throat: Reports: No symptoms


Respiratory: Reports: No symptoms


Cardiac: Reports: No symptoms


GI: Reports: No symptoms


: Reports: No symptoms


Musculoskeletal: Reports: No symptoms


Skin: Reports: No symptoms


Neurological: Reports: No symptoms


Endocrine: Reports: No symptoms


Hematologic/Lymphatic: Reports: No symptoms


All Other Systems: Reviewed and Negative





Past Medical History





- Past Medical History


Previously Healthy: Yes


Endocrine: Reports: Hypothyroid


Cardiovascular: Reports: Hypertension


Respiratory: Reports: COPD, Asthma


Hematological: Reports: Anemia


Gastrointestinal: Reports: GERD (nexium ), Pancreatitis (stents with 

pancreatitis-chronic pancreatitis)


Genitourinary: Reports: None


Neuro/Psych: Reports: Migraine, Seizure, Anxiety.  Denies: Depression (

fluoxetine)


Musculoskeletal: Reports: Arthritis, Back Pain, Other (soma phenergan)


Cancer: Reports: None


Last Menstrual Period: na


Other Pertinent Past Medical History: Chronic back pain ,intrathecal pain pump 

and removal MEDIPORT





- Surgical History


General Surgical History: Reports: Hysterectomy (PARTIAL HYSTERECTOMY 2010), C-

section, Cholecystectomy, Tonsillectomy, Adenoidectomy, Orthopedic (bilat. feet 

surg;RIGHT AND LEFT FOOT SURGERY), Other (stents with pancreatitis-chronic 

pancreatitis, MEDIPORT )





- Family History


Family History: Reports: None





- Social History


Smoking Status: Never smoker


Hx Substance Use: No


Alcohol Screening: Occasionally





- Immunizations


Tetanus Shot up to Date: No


Influenza Vaccine within 12 Months: No


Pneumococcal Vaccine up to Date: No





Physical Exam





- Physical Exam


Appearance: Well-appearing, No pain distress, Well-nourished


Eyes: YEIMY, EOMI, Conjunctiva clear


ENT: Ears normal, Nose normal, Oropharynx normal


Respiratory: Airway patent, Breath sounds clear, Breath sounds equal, 

Respirations nonlabored


Cardiovascular: RRR, Pulses normal, No rub, No murmur


GI/: Soft, Nontender, No masses, Bowel sounds normal, No Organomegaly


Musculoskeletal: Normal strength, ROM intact, No edema, No calf tenderness


Skin: Warm (rt Index finger superficial cut, dried blood, 1 cm), Dry, Normal 

color


Neurological: Sensation intact, Motor intact, Reflexes intact, Cranial nerves 

intact, Alert, Oriented


Psychiatric: Affect appropriate, Mood appropriate





Critical Care Note





- Critical Care Note


Total Time (mins): 0





Course





- Course


Orders, Labs, Meds: 





Orders











 Category Date Time Status


 


 Diphth,Pertuss(Acell),Tet Vac [Boostrix] MEDS  11/26/17 12:05 Once





 0.5 ml IM .ONCE ONE   








Medications











Generic Name Dose Route Start Last Admin





  Trade Name Freq  PRN Reason Stop Dose Admin


 


Diphtheria/Pertussis/Tetanus Vacc  0.5 ml  11/26/17 12:05  





  Boostrix  IM  11/26/17 12:06  





  .ONCE ONE   











Vital Signs: 





 











  Temp Pulse Resp BP Pulse Ox


 


 11/26/17 11:51  98.7 F  108 H  20  146/86 H  96














Departure





- Departure


Time of Disposition: 12:09


Disposition: HOME SELF-CARE


Discharge Problem: 


 Laceration of finger





Instructions:  Laceration (ED)


Condition: Good


Pt referred to PMD for follow-up: No


Additional Instructions: 


wound hygiene


Keep finger elevated.





Allergies/Adverse Reactions: 


Allergies





divalproex sodium [From Depakote] Adverse Reaction (Verified 11/12/17 13:36)


 


ketorolac [From Toradol] Adverse Reaction (Verified 11/12/17 13:36)


 


metoclopramide HCl [From Reglan] Adverse Reaction (Verified 11/12/17 13:36)


 








Home Medications: 


Ambulatory Orders





Carisoprodol [Soma] 350 mg PO PRN PRN 09/02/15 


Amitriptyline HCl [Elavil] 100 mg PO DAILY 01/25/17 


Doxepin HCl [Sinequan] 25 mg PO BEDTIME 03/01/17 


Esomeprazole Magnesium [Nexium] 20 mg PO DAILY 05/16/17 


Promethazine HCl [Phenergan Tab] 25 mg PO Q6H PRN #15 tablet 08/14/17 








Disposition Discussed With: Patient

## 2017-12-05 ENCOUNTER — HOSPITAL ENCOUNTER (EMERGENCY)
Dept: HOSPITAL 58 - ED | Age: 47
Discharge: HOME | End: 2017-12-05

## 2017-12-05 VITALS — BODY MASS INDEX: 33.7 KG/M2

## 2017-12-05 DIAGNOSIS — G43.909: Primary | ICD-10-CM

## 2017-12-05 PROCEDURE — 96372 THER/PROPH/DIAG INJ SC/IM: CPT

## 2017-12-05 PROCEDURE — 99282 EMERGENCY DEPT VISIT SF MDM: CPT

## 2017-12-05 NOTE — ED.PDOC
General


ED Provider: 


Dr. CHANTEL MEDELLIN





Chief Complaint: Headache


Stated Complaint: Patient is a 47 year old female who has a history of migranes 

comes to the ER with acute migrane heacahe across the forehead with nausea but 

no vomiting. not on any prevenatative medications. Insurance would not cover 

imitrex previosly prescribed.


Time Seen by Physician: 20:02


Mode of Arrival: Walk-In


Information Source: Patient


Exam Limitations: No limitations


Nursing and Triage Documentation Reviewed and Agree: Yes





Neurological Complaint Exam





- Headache Complaint/Exam


Onset: Gradual


Duration: 1 day 


Symptoms Are: Still present


Timing: Constant


Worst Headache Ever: No


Initial Severity: Severe


Current Severity: Severe


Location: Frontal


Character: Reports: Throbbing, Typical headache, Migraine


Aggravating: Reports: Bright lights


Alleviating: Reports: None


Associated Signs and Symptoms: Reports: Nausea.  Denies: Dizziness, Seizure, 

Vomiting, Sinus pressure, Fever, Neck pain, Neck stiffness, Decreased LOC, 

Visual changes


Related Surgical History: Reports: None


SAH Risk Factors: Reports: None


Meningitis Risk Factors: Reports: None


SDH Risk Factors: Reports: None


Temporal Arteritis Risk Factors: Reports: None


Normal Head CT Within Last 12 Months: Yes


Temporal Artery Tenderness: Present: None


Sinus Tenderness: Present: None


TMJ Tenderness: Present: None


Glascow Coma Scale (see protocol): 15


Meningeal Signs Positive: No


Pain on Passive Flexion-Positive Kernig's: No


Focal Weakness: Present: None


Focal Sensory Loss: Present: None


Gait: Normal


Nystagmus Present: No


Gag Reflex Present: No


Finger-to-Nose: Normal Findings


Romberg Test Positive: No


Babinski Sign: Negative Right, Negative Left


Heel to Toe Normal: No


Head Picture: 


  __________________________














  __________________________





 1 - pain





Differential Diagnoses: Migraine, Tension Headache, Viral Syndrome





Review of Systems





- Review Of Systems


Constitutional: Reports: No symptoms, Loss of appetite


Eyes: Reports: Photophobia


Ears, Nose, Mouth, Throat: Reports: No symptoms


Respiratory: Reports: No symptoms


Cardiac: Reports: No symptoms


GI: Reports: Nausea.  Denies: Vomiting


: Reports: No symptoms


Musculoskeletal: Reports: No symptoms


Neurological: Reports: Headache


All Other Systems: Reviewed and Negative





Past Medical History





- Past Medical History


Previously Healthy: Yes


Endocrine: Reports: Hypothyroid


Cardiovascular: Reports: Hypertension


Respiratory: Reports: COPD, Asthma


Hematological: Reports: Anemia


Gastrointestinal: Reports: GERD (nexium ), Pancreatitis (stents with 

pancreatitis-chronic pancreatitis)


Genitourinary: Reports: None


Neuro/Psych: Reports: Migraine, Seizure, Anxiety.  Denies: Depression (

fluoxetine)


Musculoskeletal: Reports: Arthritis, Back Pain, Other (soma phenergan)


Cancer: Reports: None


Last Menstrual Period: HYSTERECTOMY


Other Pertinent Past Medical History: Chronic back pain ,intrathecal pain pump 

and removal MEDIPORT





- Surgical History


General Surgical History: Reports: Hysterectomy (PARTIAL HYSTERECTOMY 2010), C-

section, Cholecystectomy, Tonsillectomy, Adenoidectomy, Orthopedic (bilat. feet 

surg;RIGHT AND LEFT FOOT SURGERY), Other (stents with pancreatitis-chronic 

pancreatitis, MEDIPORT )





- Family History


Family History: Reports: None





- Social History


Smoking Status: Never smoker


Hx Substance Use: No


Alcohol Screening: Occasionally





- Immunizations


Tetanus Shot up to Date: Yes


Influenza Vaccine within 12 Months: No


Pneumococcal Vaccine up to Date: No





Physical Exam





- Physical Exam


Appearance: Ill-appearing


Ill-appearing: Moderate


Pain Distress: None


Eyes: YEIMY, EOMI, Conjunctiva clear


Neck: Supple


Respiratory: Airway patent, Breath sounds clear, Breath sounds equal, 

Respirations nonlabored


Cardiovascular: RRR, Pulses normal, No rub, No murmur


GI/: Soft (obese )


Musculoskeletal: Normal strength, ROM intact, No edema, No calf tenderness


Skin: Warm, Dry, Normal color


Neurological: Sensation intact, Motor intact, Reflexes intact, Cranial nerves 

intact, Alert, Oriented


Psychiatric: Anxious





Critical Care Note





- Critical Care Note


Total Time (mins): 0





Course





- Course


Orders, Labs, Meds: 


Orders











 Category Date Time Status


 


 Promethazine HCl [Phenergan 25 mg/ml Vial] MEDS  12/05/17 20:01 Discontinued





 25 mg IM ONCE STA   


 


 Sumatriptan Succinate [Imitrex] MEDS  12/05/17 20:01 Discontinued





 6 mg SUBCUT ONCE STA   








Medications














Discontinued Medications














Generic Name Dose Route Start Last Admin





  Trade Name Freq  PRN Reason Stop Dose Admin


 


Promethazine HCl  25 mg  12/05/17 20:01  12/05/17 20:09





  Phenergan 25 Mg/Ml Vial  IM  12/05/17 20:02  25 mg





  ONCE STA   Administration


 


Sumatriptan Succinate  6 mg  12/05/17 20:01  12/05/17 20:08





  Imitrex  SUBCUT  12/05/17 20:02  6 mg





  ONCE STA   Administration











Vital Signs: 


 











  Temp Pulse Resp BP Pulse Ox


 


 12/05/17 19:14  98 F  106 H  20  153/104 H  95














Departure





- Departure


Time of Disposition: 21:00


Disposition: HOME SELF-CARE


Discharge Problem: 


 Headache





Instructions:  Migraine Headache (ED)


Condition: Fair


Pt referred to PMD for follow-up: Yes


Prescriptions: 


Promethazine HCl [Phenergan Tab] 25 mg PO Q6H PRN #15 tablet


 PRN Reason: Nausea / Vomiting


Topiramate [Topamax] 50 mg PO BID #30 tablet


Allergies/Adverse Reactions: 


Allergies





divalproex sodium [From Depakote] Adverse Reaction (Verified 12/05/17 19:16)


 


ketorolac [From Toradol] Adverse Reaction (Verified 12/05/17 19:16)


 


metoclopramide HCl [From Reglan] Adverse Reaction (Verified 12/05/17 19:16)


 








Home Medications: 


Ambulatory Orders





Carisoprodol [Soma] 350 mg PO PRN PRN 09/02/15 


Amitriptyline HCl [Elavil] 100 mg PO DAILY 01/25/17 


Doxepin HCl [Sinequan] 25 mg PO BEDTIME 03/01/17 


Esomeprazole Magnesium [Nexium] 20 mg PO DAILY 05/16/17 


Promethazine HCl [Phenergan Tab] 25 mg PO Q6H PRN #15 tablet 08/14/17 


Pregabalin [Lyrica] 25 mg PO DAILY 12/05/17 


Promethazine HCl [Phenergan Tab] 25 mg PO Q6H PRN #15 tablet 12/05/17 


Topiramate [Topamax] 50 mg PO BID #30 tablet 12/05/17 








Disposition Discussed With: Patient, Family

## 2017-12-06 VITALS — SYSTOLIC BLOOD PRESSURE: 136 MMHG | TEMPERATURE: 98.1 F | DIASTOLIC BLOOD PRESSURE: 86 MMHG

## 2018-01-21 ENCOUNTER — HOSPITAL ENCOUNTER (EMERGENCY)
Dept: HOSPITAL 58 - ED | Age: 48
Discharge: HOME | End: 2018-01-21

## 2018-01-21 VITALS — BODY MASS INDEX: 34.8 KG/M2

## 2018-01-21 VITALS — SYSTOLIC BLOOD PRESSURE: 138 MMHG | DIASTOLIC BLOOD PRESSURE: 86 MMHG | TEMPERATURE: 98 F

## 2018-01-21 DIAGNOSIS — H65.02: Primary | ICD-10-CM

## 2018-01-21 DIAGNOSIS — R51: ICD-10-CM

## 2018-01-21 PROCEDURE — 96372 THER/PROPH/DIAG INJ SC/IM: CPT

## 2018-01-21 PROCEDURE — 99282 EMERGENCY DEPT VISIT SF MDM: CPT

## 2018-01-21 NOTE — ED.PDOC
General


ED Provider: 


Dr. YINA JENSEN-ER





Chief Complaint: Headache


Stated Complaint: sal got a migraine--had procedure in [place] recently--

also had uri symptoms and had sensation of diminished hearing left ear


Time Seen by Physician: 22:20


Mode of Arrival: Walk-In


Information Source: Patient


Exam Limitations: No limitations


Primary Care Provider: 


MYRON LANGSTON





Nursing and Triage Documentation Reviewed and Agree: Yes


Reviewed sepsis parameters & appropriate labs ordered?: Yes


System Inflammatory Response Syndrome: Not Applicable


Sepsis Protocol: 


For patient's 13 years and over:





Temp is 96.8 and below  and greater


Pulse >90 BPM


Resp >20/minute


Acutely Altered Mental Status





Are patient's symptoms suggestive of a new infection, such as:


   -Pneumonia


   -Skin, Soft Tissue


   -Endocarditis


   -UTI


   -Bone, Joint Infection


   -Implantable Device


   -Acute Abdominal Infection


   -Wound Infection


   -Meningitis


   -Blood Stream Catheter Infection


   -Unknown








Neurological Complaint Exam





- Headache Complaint/Exam


Onset: Gradual


Duration: several hours


Symptoms Are: Still present


Timing: Constant


Worst Headache Ever: No


Initial Severity: Mild


Current Severity: Moderate


Location: Diffuse


Character: Reports: Dull, Throbbing, Typical headache, Migraine


Aggravating: Reports: Bright lights


Alleviating: Reports: None


Associated Signs and Symptoms: Reports: Nausea.  Denies: Dizziness, Seizure, 

Vomiting, Sinus pressure, Fever, Neck pain, Neck stiffness, Decreased LOC, 

Visual changes


Related History: Reports: Similar episode


Related Surgical History: Reports: None


Temporal Arteritis Risk Factors: Reports: None


Normal Head CT Within Last 12 Months: No


Fundoscopic Exam: Present: Normal Findings


Papilledema Present: No


Temporal Artery Tenderness: Present: None


Sinus Tenderness: Present: None


TMJ Tenderness: Present: None


Glascow Coma Scale (see protocol): 15


Meningeal Signs Positive: No


Pain on Passive Flexion-Positive Kernig's: No


ROM Limited In: No Limitiations


Focal Weakness: Present: None


Focal Sensory Loss: Present: None


Gait: Normal


Nystagmus Present: No


Gag Reflex Present: No


Finger-to-Nose: Normal Findings


Romberg Test Positive: No


Babinski Sign: Negative Right, Negative Left


Heel to Toe Normal: Yes


Differential Diagnoses: Migraine





Review of Systems





- Review Of Systems


Constitutional: Reports: No symptoms


Eyes: Reports: No symptoms


Ears, Nose, Mouth, Throat: Reports: No symptoms


Respiratory: Reports: No symptoms


Cardiac: Reports: No symptoms


GI: Reports: No symptoms


: Reports: No symptoms


Musculoskeletal: Reports: No symptoms


Skin: Reports: No symptoms


Neurological: Reports: Headache


Endocrine: Reports: No symptoms


Hematologic/Lymphatic: Reports: No symptoms


All Other Systems: Reviewed and Negative





Past Medical History





- Past Medical History


Previously Healthy: Yes


Endocrine: Reports: Hypothyroid


Cardiovascular: Reports: Hypertension


Respiratory: Reports: COPD, Asthma


Hematological: Reports: Anemia


Gastrointestinal: Reports: GERD (nexium ), Pancreatitis (stents with 

pancreatitis-chronic pancreatitis)


Genitourinary: Reports: None


Neuro/Psych: Reports: Migraine, Seizure, Anxiety.  Denies: Depression (

fluoxetine)


Musculoskeletal: Reports: Arthritis, Back Pain, Other (soma phenergan)


Cancer: Reports: None


Last Menstrual Period: NONE


Other Pertinent Past Medical History: Chronic back pain ,intrathecal pain pump 

and removal MEDIPORT





- Surgical History


General Surgical History: Reports: Hysterectomy (PARTIAL HYSTERECTOMY 2010), C-

section, Cholecystectomy, Tonsillectomy, Adenoidectomy, Orthopedic (bilat. feet 

surg;RIGHT AND LEFT FOOT SURGERY), Other (stents with pancreatitis-chronic 

pancreatitis, MEDIPORT )





- Family History


Family History: Reports: None





- Social History


Smoking Status: Never smoker


Hx Substance Use: No


Alcohol Screening: Occasionally


Lives: With family





- Immunizations


Tetanus Shot up to Date: Yes


Influenza Vaccine within 12 Months: No


Pneumococcal Vaccine up to Date: No





Physical Exam





- Physical Exam


Appearance: Well-appearing, No pain distress, Well-nourished


Pain Distress: Moderate


Eyes: YEIMY, EOMI, Conjunctiva clear


ENT: Nose normal (noted air fluid level left tm)


Neck: Supple


Respiratory: Airway patent


Cardiovascular: RRR


GI/: Soft, Nontender, No masses, Bowel sounds normal, No Organomegaly


Musculoskeletal: Normal strength, ROM intact, No edema, No calf tenderness


Skin: Warm, Dry, Normal color


Neurological: Sensation intact


Psychiatric: Affect appropriate, Mood appropriate





Physician Notification





- Case Discussed


Physician Notified: dr ledesma(on call for dr cloud)--did not add to my 

suggestions of antbx 


Time of Notification: 23:43





Critical Care Note





- Critical Care Note


Total Time (mins): 0





Course





- Course


Orders, Labs, Meds: 


Orders











 Category Date Time Status


 


 Amoxicillin/Potassium Clav [Augmentin 875-125 mg Tab] MEDS  01/21/18 23:25 

Discontinued





 1 tab PO ONCE STA   


 


 Butorphanol Tartrate [Stadol] MEDS  01/21/18 22:19 Discontinued





 2 mg IM ONCE STA   


 


 Promethazine HCl [Phenergan 25 mg/ml Vial] MEDS  01/21/18 22:19 Discontinued





 25 mg IM ONCE STA   








Medications














Discontinued Medications














Generic Name Dose Route Start Last Admin





  Trade Name Freq  PRN Reason Stop Dose Admin


 


Amoxicillin/Clavulanate Potassium  1 tab  01/21/18 23:25  01/21/18 23:29





  Augmentin 875-125 Mg Tab  PO  01/21/18 23:26  1 tab





  ONCE STA   Administration


 


Butorphanol Tartrate  2 mg  01/21/18 22:19  01/21/18 22:29





  Stadol  IM  01/21/18 22:20  2 mg





  ONCE STA   Administration


 


Promethazine HCl  25 mg  01/21/18 22:19  01/21/18 22:29





  Phenergan 25 Mg/Ml Vial  IM  01/21/18 22:20  25 mg





  ONCE STA   Administration











Vital Signs: 


 











  Temp Pulse Resp BP Pulse Ox


 


 01/21/18 22:16  98 F  105 H  20  138/86  97














Departure





- Departure


Time of Disposition: 23:26


Disposition: HOME SELF-CARE


Discharge Problem: 


Otitis media


Qualifiers:


 Otitis media type: serous Chronicity: acute Laterality: left Recurrence: not 

specified as recurrent Qualified Code(s): H65.02 - Acute serous otitis media, 

left ear





Instructions:  Ear Infection (ED)


Condition: Good


Pt referred to PMD for follow-up: Yes


IPMP verified?: No


Additional Instructions: 


augmentin 875mg bid x 7days--keep appt tomorrow for follow and f/u with pcp


Allergies/Adverse Reactions: 


Allergies





divalproex sodium [From Depakote] Adverse Reaction (Verified 12/05/17 19:16)


 


ketorolac [From Toradol] Adverse Reaction (Verified 12/05/17 19:16)


 


metoclopramide HCl [From Reglan] Adverse Reaction (Verified 12/05/17 19:16)


 








Home Medications: 


Ambulatory Orders





Carisoprodol [Soma] 350 mg PO PRN PRN 09/02/15 


Amitriptyline HCl [Elavil] 100 mg PO DAILY 01/25/17 


Doxepin HCl [Sinequan] 25 mg PO BEDTIME 03/01/17 


Esomeprazole Magnesium [Nexium] 20 mg PO DAILY 05/16/17 


Promethazine HCl [Phenergan Tab] 25 mg PO Q6H PRN #15 tablet 08/14/17 


Pregabalin [Lyrica] 25 mg PO DAILY 12/05/17 


Promethazine HCl [Phenergan Tab] 25 mg PO Q6H PRN #15 tablet 12/05/17 


Topiramate [Topamax] 50 mg PO BID #30 tablet 12/05/17 








Disposition Discussed With: Patient, Family

## 2018-03-08 ENCOUNTER — HOSPITAL ENCOUNTER (EMERGENCY)
Dept: HOSPITAL 58 - ED | Age: 48
Discharge: HOME | End: 2018-03-08

## 2018-03-08 VITALS — BODY MASS INDEX: 35.6 KG/M2

## 2018-03-08 VITALS — TEMPERATURE: 97 F | DIASTOLIC BLOOD PRESSURE: 90 MMHG | SYSTOLIC BLOOD PRESSURE: 132 MMHG

## 2018-03-08 DIAGNOSIS — G43.909: Primary | ICD-10-CM

## 2018-03-08 PROCEDURE — 99283 EMERGENCY DEPT VISIT LOW MDM: CPT

## 2018-03-08 PROCEDURE — 99282 EMERGENCY DEPT VISIT SF MDM: CPT

## 2018-03-08 PROCEDURE — 96372 THER/PROPH/DIAG INJ SC/IM: CPT

## 2018-03-08 RX ADMIN — SODIUM CHLORIDE STA MG: 9 INJECTION, SOLUTION INTRAVENOUS at 22:59

## 2018-03-08 RX ADMIN — BUTORPHANOL TARTRATE STA MG: 2 INJECTION, SOLUTION INTRAMUSCULAR; INTRAVENOUS at 22:54

## 2018-03-08 RX ADMIN — SODIUM CHLORIDE STA MG: 9 INJECTION, SOLUTION INTRAVENOUS at 22:58

## 2018-03-08 NOTE — ED.PDOC
General


ED Provider: 


Dr. YINA JENSEN-ER





Chief Complaint: Headache


Stated Complaint: sal got a migraine head--im nauseated


Time Seen by Physician: 22:39


Mode of Arrival: Walk-In


Information Source: Patient


Exam Limitations: No limitations


Primary Care Provider: 


[facility] EISENBERG[facility]





Nursing and Triage Documentation Reviewed and Agree: Yes


Reviewed sepsis parameters & appropriate labs ordered?: Yes


System Inflammatory Response Syndrome: Not Applicable


Sepsis Protocol: 


For patient's 13 years and over:





Temp is 96.8 and below  and greater


Pulse >90 BPM


Resp >20/minute


Acutely Altered Mental Status





Are patient's symptoms suggestive of a new infection, such as:


   -Pneumonia


   -Skin, Soft Tissue


   -Endocarditis


   -UTI


   -Bone, Joint Infection


   -Implantable Device


   -Acute Abdominal Infection


   -Wound Infection


   -Meningitis


   -Blood Stream Catheter Infection


   -Unknown








Neurological Complaint Exam





- Headache Complaint/Exam


Onset: Gradual


Duration: several hours


Symptoms Are: Still present


Timing: Constant


Episodes Lasting: Hours


Worst Headache Ever: No


Initial Severity: Mild


Current Severity: Moderate


Location: Diffuse


Character: Reports: Dull, Throbbing, Pressure, Typical headache, Migraine


Aggravating: Reports: Bright lights


Alleviating: Reports: None


Associated Signs and Symptoms: Reports: Nausea, Vomiting.  Denies: Dizziness, 

Seizure, Sinus pressure, Fever, Neck pain, Neck stiffness, Decreased LOC, 

Visual changes


Related History: Reports: Similar episode


Related Surgical History: Reports: None


Temporal Arteritis Risk Factors: Reports: Female, 


Normal Head CT Within Last 12 Months: Yes


Fundoscopic Exam: Present: Normal Findings


Papilledema Present: No


Temporal Artery Tenderness: Present: None


Sinus Tenderness: Present: None


TMJ Tenderness: Present: None


Glascow Coma Scale (see protocol): 15


Meningeal Signs Positive: No


Pain on Passive Flexion-Positive Kernig's: No


ROM Limited In: No Limitiations


Focal Weakness: Present: None


Focal Sensory Loss: Present: None


Gait: Normal


Nystagmus Present: No


Gag Reflex Present: Yes


Finger-to-Nose: Normal Findings


Romberg Test Positive: No


Babinski Sign: Negative Right, Negative Left


Heel to Toe Normal: Yes


Differential Diagnoses: Migraine





Review of Systems





- Review Of Systems


Constitutional: Reports: No symptoms


Eyes: Reports: No symptoms


Ears, Nose, Mouth, Throat: Reports: No symptoms


Respiratory: Reports: No symptoms


Cardiac: Reports: No symptoms


GI: Reports: Nausea, Vomiting


: Reports: No symptoms


Musculoskeletal: Reports: No symptoms


Skin: Reports: No symptoms


Neurological: Reports: Headache


Endocrine: Reports: No symptoms


Hematologic/Lymphatic: Reports: No symptoms


All Other Systems: Reviewed and Negative





Past Medical History





- Past Medical History


Previously Healthy: Yes


Endocrine: Reports: Hypothyroid


Cardiovascular: Reports: Hypertension


Respiratory: Reports: COPD, Asthma


Hematological: Reports: Anemia


Gastrointestinal: Reports: GERD (nexium ), Pancreatitis (stents with 

pancreatitis-chronic pancreatitis)


Genitourinary: Reports: None


Neuro/Psych: Reports: Migraine, Seizure, Anxiety.  Denies: Depression (

fluoxetine)


Musculoskeletal: Reports: Arthritis, Back Pain, Other (soma phenergan)


Cancer: Reports: None


Last Menstrual Period: 2010 hyst.


Other Pertinent Past Medical History: Chronic back pain ,intrathecal pain pump 

and removal MEDIPORT





- Surgical History


General Surgical History: Reports: Hysterectomy (PARTIAL HYSTERECTOMY 2010), C-

section, Cholecystectomy, Tonsillectomy, Adenoidectomy, Orthopedic (bilat. feet 

surg;RIGHT AND LEFT FOOT SURGERY), Other (stents with pancreatitis-chronic 

pancreatitis, MEDIPORT )





- Family History


Family History: Reports: None





- Social History


Smoking Status: Never smoker


Hx Substance Use: No


Alcohol Screening: Occasionally





- Immunizations


Tetanus Shot up to Date: Yes


Influenza Vaccine within 12 Months: No


Pneumococcal Vaccine up to Date: No





Physical Exam





- Physical Exam


Appearance: Well-appearing, No pain distress, Well-nourished


Pain Distress: Moderate


Eyes: YEIMY, EOMI, Conjunctiva clear


ENT: Ears normal, Nose normal, Oropharynx normal


Neck: Supple


Respiratory: Airway patent, Breath sounds clear, Breath sounds equal, 

Respirations nonlabored


Cardiovascular: RRR, Pulses normal, No rub, No murmur


GI/: Soft, Nontender, No masses, Bowel sounds normal, No Organomegaly


Musculoskeletal: Normal strength, ROM intact, No edema, No calf tenderness


Skin: Warm


Neurological: Sensation intact, Motor intact, Reflexes intact, Cranial nerves 

intact, Alert, Oriented


Psychiatric: Affect appropriate, Mood appropriate





Re-Evaluation





- Re-Evaluation


Time of Re-Evaluation: 23:05


Status: Improved


Vital Signs Stable: Yes


Pain Level: 1


Appearance: NAD


Lungs: Clear


Skin: Warm and Dry


Neuro: Alert and Oriented X3


CV: RRR





Critical Care Note





- Critical Care Note


Total Time (mins): 0





Course





- Course


Orders, Labs, Meds: 





Orders











 Category Date Time Status


 


 Butorphanol Tartrate [Stadol] MEDS  03/08/18 22:38 Stat





 2 mg IM ONCE STA   


 


 Diphenhydramine Inj [Benadryl] MEDS  03/08/18 22:38 Stat





 25 mg IM ONCE STA   


 


 Promethazine HCl [Phenergan 25 mg/ml Vial] MEDS  03/08/18 22:38 Stat





 25 mg IM ONCE STA   








Medications











Generic Name Dose Route Start Last Admin





  Trade Name Freq  PRN Reason Stop Dose Admin


 


Butorphanol Tartrate  2 mg  03/08/18 22:38  





  Stadol  IM  03/08/18 22:39  





  ONCE STA   


 


Diphenhydramine HCl  25 mg  03/08/18 22:38  





  Benadryl  IM  03/08/18 22:39  





  ONCE STA   


 


Promethazine HCl  25 mg  03/08/18 22:38  





  Phenergan 25 Mg/Ml Vial  IM  03/08/18 22:39  





  ONCE STA   











Vital Signs: 





 











  Temp Pulse Resp BP Pulse Ox


 


 03/08/18 22:27  97 F L  98 H  20  132/90  97














Departure





- Departure


Time of Disposition: 22:42


Disposition: HOME SELF-CARE


Discharge Problem: 


Migraine


Qualifiers:


 Migraine type: unspecified Status migrainosus presence: without status 

migrainosus Intractability: not intractable Qualified Code(s): G43.909 - 

Migraine, unspecified, not intractable, without status migrainosus





Instructions:  Migraine Headache (ED)


Condition: Good


Pt referred to PMD for follow-up: Yes


IPMP verified?: No


Additional Instructions: 


f/u with pcp


Allergies/Adverse Reactions: 


Allergies





divalproex sodium [From Depakote] Adverse Reaction (Verified 03/08/18 22:32)


 


ketorolac [From Toradol] Adverse Reaction (Verified 03/08/18 22:32)


 


metoclopramide HCl [From Reglan] Adverse Reaction (Verified 03/08/18 22:32)


 








Home Medications: 


Ambulatory Orders





Carisoprodol [Soma] 350 mg PO PRN PRN 09/02/15 


Amitriptyline HCl [Elavil] 100 mg PO DAILY 01/25/17 


Doxepin HCl [Sinequan] 25 mg PO BEDTIME 03/01/17 


Esomeprazole Magnesium [Nexium] 20 mg PO DAILY 05/16/17 


Promethazine HCl [Phenergan Tab] 25 mg PO Q6H PRN #15 tablet 08/14/17 


Pregabalin [Lyrica] 75 mg PO DAILY 12/05/17 


Topiramate [Topamax] 100 mg PO BID 03/08/18 








Disposition Discussed With: Patient, Family

## 2018-03-23 ENCOUNTER — HOSPITAL ENCOUNTER (EMERGENCY)
Dept: HOSPITAL 58 - ED | Age: 48
Discharge: HOME | End: 2018-03-23

## 2018-03-23 VITALS — DIASTOLIC BLOOD PRESSURE: 97 MMHG | TEMPERATURE: 98 F | SYSTOLIC BLOOD PRESSURE: 141 MMHG

## 2018-03-23 VITALS — BODY MASS INDEX: 34.8 KG/M2

## 2018-03-23 DIAGNOSIS — G43.109: Primary | ICD-10-CM

## 2018-03-23 PROCEDURE — 99283 EMERGENCY DEPT VISIT LOW MDM: CPT

## 2018-03-23 PROCEDURE — 96365 THER/PROPH/DIAG IV INF INIT: CPT

## 2018-03-23 PROCEDURE — 96375 TX/PRO/DX INJ NEW DRUG ADDON: CPT

## 2018-03-23 NOTE — ED.PDOC
General





<CHANTEL MEDELLIN - Last Filed: 03/23/18 20:35>


Stated Complaint: TYPICAL MIGRAINE, STARTED YESTERDAY NIGHT


Time Seen by Physician: 18:25


Mode of Arrival: Walk-In


Information Source: Patient


Exam Limitations: No limitations


Referred to ED by: PCP


Nursing and Triage Documentation Reviewed and Agree: Yes


Reviewed sepsis parameters & appropriate labs ordered?: Yes


System Inflammatory Response Syndrome: Not Applicable





<HEIDYYINA - Last Filed: 04/18/18 13:13>


ED Provider: 


Dr. YINA MOODY MD





Chief Complaint: Headache


[facility] 


[facility]





Sepsis Protocol: 


For patient's 13 years and over:





Temp is 96.8 and below  and greater


Pulse >90 BPM


Resp >20/minute


Acutely Altered Mental Status





Are patient's symptoms suggestive of a new infection, such as:


   -Pneumonia


   -Skin, Soft Tissue


   -Endocarditis


   -UTI


   -Bone, Joint Infection


   -Implantable Device


   -Acute Abdominal Infection


   -Wound Infection


   -Meningitis


   -Blood Stream Catheter Infection


   -Unknown








Review of Systems





- Review Of Systems


Constitutional: Reports: No symptoms


Eyes: Reports: Photophobia (MILD)


Ears, Nose, Mouth, Throat: Reports: No symptoms


Respiratory: Reports: No symptoms


Cardiac: Reports: No symptoms


GI: Reports: No symptoms


: Reports: No symptoms


Musculoskeletal: Reports: No symptoms


Skin: Reports: No symptoms


Neurological: Reports: No symptoms


Endocrine: Reports: No symptoms


Hematologic/Lymphatic: Reports: No symptoms


All Other Systems: Reviewed and Negative





<HEIDYYINA - Last Filed: 04/18/18 13:13>





Past Medical History





- Past Medical History


Previously Healthy: Yes


Endocrine: Reports: Hypothyroid


Cardiovascular: Reports: Hypertension


Respiratory: Reports: COPD, Asthma


Hematological: Reports: Anemia


Gastrointestinal: Reports: GERD (nexium ), Pancreatitis (stents with 

pancreatitis-chronic pancreatitis)


Genitourinary: Reports: None


Neuro/Psych: Reports: Migraine, Seizure, Anxiety.  Denies: Depression (

fluoxetine)


Musculoskeletal: Reports: Arthritis, Back Pain, Other (soma phenergan)


Cancer: Reports: None


Other Pertinent Past Medical History: Chronic back pain ,intrathecal pain pump 

and removal MEDIPORT





- Surgical History


General Surgical History: Reports: Hysterectomy (PARTIAL HYSTERECTOMY 2010), C-

section, Cholecystectomy, Tonsillectomy, Adenoidectomy, Orthopedic (bilat. feet 

surg;RIGHT AND LEFT FOOT SURGERY), Other (stents with pancreatitis-chronic 

pancreatitis, MEDIPORT )





- Family History


Family History: Reports: None





- Social History


Smoking Status: Never smoker


Hx Substance Use: No


Alcohol Screening: Occasionally





- Immunizations


Influenza Vaccine within 12 Months: No


Pneumococcal Vaccine up to Date: No





<YINA MOODY - Last Filed: 04/18/18 13:13>





Physical Exam





- Physical Exam


Appearance: Well-appearing, No pain distress, Well-nourished


Ill-appearing: Moderate


Pain Distress: Moderate


Eyes: YEIMY, EOMI, Conjunctiva clear


ENT: Ears normal, Nose normal, Oropharynx normal


Respiratory: Airway patent, Breath sounds clear, Breath sounds equal, 

Respirations nonlabored


Cardiovascular: RRR, Pulses normal, No rub, No murmur


GI/: Soft, Nontender, No masses, Bowel sounds normal, No Organomegaly


Musculoskeletal: Normal strength, ROM intact, No edema, No calf tenderness


Skin: Warm, Dry, Normal color


Neurological: Sensation intact, Motor intact, Reflexes intact, Cranial nerves 

intact, Alert, Oriented


Psychiatric: Affect appropriate, Mood appropriate





<YINA MOODY - Last Filed: 04/18/18 13:13>





Critical Care Note





- Critical Care Note


Total Time (mins): 0





<YINA MOODY - Last Filed: 04/18/18 13:13>





- Course


Orders, Labs, Meds: 


Orders











 Category Date Time Status


 


 Butorphanol Tartrate [Stadol] MEDS  03/23/18 20:13 Discontinued





 1 mg IVP ONCE STA   


 


 Methylprednisolone Sod Succ/Pf [Solu-Medrol 125 mg] MEDS  03/23/18 18:30 

Discontinued





 125 mg IM ONCE STA   


 


 Promethazine HCl [Phenergan 25 mg/ml Vial] MEDS  03/23/18 18:36 Discontinued





 25 mg .ROUTE .STK-MED ONE   


 


 Promethazine HCl [Phenergan 25 mg/ml Vial] 25 mg MEDS  03/23/18 18:30 

Discontinued





 0.9 % Sodium Chloride [Sodium Chloride] 50 ml   





 IV ONCE   








Medications














Discontinued Medications














Generic Name Dose Route Start Last Admin





  Trade Name Freq  PRN Reason Stop Dose Admin


 


Butorphanol Tartrate  1 mg  03/23/18 20:13  03/23/18 20:23





  Stadol  IVP  03/23/18 20:14  1 mg





  ONCE STA   Administration


 


Promethazine HCl 25 mg/ Sodium  51 mls @ 75 mls/hr  03/23/18 18:30  03/23/18 19:

14





  Chloride  IV  03/23/18 19:10  75 mls/hr





  ONCE STA   Administration


 


Methylprednisolone Sodium Succinate  125 mg  03/23/18 18:30  03/23/18 19:15





  Solu-Medrol 125 Mg  IM  03/23/18 18:31  Not Given





  ONCE STA   











Vital Signs: 


 











  Temp Pulse Resp BP Pulse Ox


 


 03/23/18 18:02  98.0 F  111 H  20  141/97 H  94 L














Departure





- Departure


Time of Disposition: 20:35


Pt referred to PMD for follow-up: Yes


IPMP verified?: No





<CHANTEL MEDELLIN - Last Filed: 03/23/18 20:35>





<YINA MOODY - Last Filed: 04/18/18 13:13>





- Departure


Disposition: HOME SELF-CARE


Discharge Problem: 


 Headache





Migraine


Qualifiers:


 Migraine type: with aura Status migrainosus presence: without status 

migrainosus Intractability: not intractable Qualified Code(s): G43.109 - 

Migraine with aura, not intractable, without status migrainosus





Instructions:  Migraine Headache (ED)


Condition: Stable


Allergies/Adverse Reactions: 


Allergies





divalproex sodium [From Depakote] Adverse Reaction (Verified 04/16/18 15:26)


 


ketorolac [From Toradol] Adverse Reaction (Verified 04/16/18 15:26)


 


metoclopramide HCl [From Reglan] Adverse Reaction (Verified 04/16/18 15:26)


 








Home Medications: 


Ambulatory Orders





Carisoprodol [Soma] 350 mg PO PRN PRN 09/02/15 


Amitriptyline HCl [Elavil] 100 mg PO DAILY 01/25/17 


Doxepin HCl [Sinequan] 25 mg PO BEDTIME 03/01/17 


Esomeprazole Magnesium [Nexium] 20 mg PO DAILY 05/16/17 


Promethazine HCl [Phenergan Tab] 25 mg PO Q6H PRN #15 tablet 08/14/17 


Pregabalin [Lyrica] 75 mg PO DAILY 12/05/17 


Topiramate [Topamax] 200 mg PO BID 03/08/18 


Butorphanol Tartrate 10 mg NS AS DIRECTED PRN 03/23/18

## 2018-04-04 ENCOUNTER — HOSPITAL ENCOUNTER (EMERGENCY)
Dept: HOSPITAL 58 - ED | Age: 48
Discharge: HOME | End: 2018-04-04

## 2018-04-04 VITALS — DIASTOLIC BLOOD PRESSURE: 96 MMHG | SYSTOLIC BLOOD PRESSURE: 142 MMHG | TEMPERATURE: 97.5 F

## 2018-04-04 VITALS — BODY MASS INDEX: 35.2 KG/M2

## 2018-04-04 DIAGNOSIS — I10: ICD-10-CM

## 2018-04-04 DIAGNOSIS — R10.84: Primary | ICD-10-CM

## 2018-04-04 PROCEDURE — 96374 THER/PROPH/DIAG INJ IV PUSH: CPT

## 2018-04-04 PROCEDURE — 96361 HYDRATE IV INFUSION ADD-ON: CPT

## 2018-04-04 PROCEDURE — 96375 TX/PRO/DX INJ NEW DRUG ADDON: CPT

## 2018-04-04 PROCEDURE — 80053 COMPREHEN METABOLIC PANEL: CPT

## 2018-04-04 PROCEDURE — 96372 THER/PROPH/DIAG INJ SC/IM: CPT

## 2018-04-04 PROCEDURE — 85025 COMPLETE CBC W/AUTO DIFF WBC: CPT

## 2018-04-04 PROCEDURE — 99283 EMERGENCY DEPT VISIT LOW MDM: CPT

## 2018-04-04 PROCEDURE — 36415 COLL VENOUS BLD VENIPUNCTURE: CPT

## 2018-04-04 NOTE — ED.PDOC
General


ED Provider: 


Dr. ALE MANLEY





Chief Complaint: Abdominal Pain


Stated Complaint: ABDOMINAL PAIN CHRONIC


Time Seen by Physician: 17:19 (SEEN WITH HILDA AT ALL TIMES )


Mode of Arrival: Walk-In


Information Source: Patient


Exam Limitations: No limitations


Primary Care Provider: 


[facility]





Nursing and Triage Documentation Reviewed and Agree: Yes


Reviewed sepsis parameters & appropriate labs ordered?: Yes


System Inflammatory Response Syndrome: Not Applicable


Sepsis Protocol: 


For patient's 13 years and over:





Temp is 96.8 and below  and greater


Pulse >90 BPM


Resp >20/minute


Acutely Altered Mental Status





Are patient's symptoms suggestive of a new infection, such as:


   -Pneumonia


   -Skin, Soft Tissue


   -Endocarditis


   -UTI


   -Bone, Joint Infection


   -Implantable Device


   -Acute Abdominal Infection


   -Wound Infection


   -Meningitis


   -Blood Stream Catheter Infection


   -Unknown





System Inflammatory Response Syndrome: Not Applicable





GI Complaint Exam





- Abdominal Pain Complaint/Exam


Onset: Gradual


Duration: CHRONIC WORSE TODAY THIS IS THE SAME PAIN PT SUFFERS AT ALL [place] 


Symptoms Are: Still present


Timing: Constant


Initial Severity: Moderate


Current Severity: Moderate


Location of Pain: Diffuse


Character: Reports: Cramping


Aggravating: Reports: None


Alleviating: Reports: None


Associated Signs and Symptoms: Denies: Diaphoresis, Fever, Cough, Chest pain, 

Dizziness, Back pain, Constipation, Blood in stool, Dysuria, Urinary frequency, 

Decreased urine output, Decreased appetite, Vaginal bleeding, Vaginal discharge

, Nausea, Vomiting, Diarrhea, Sore throat, Decreased activity


Related History: Reports: Similar episode


AAA Risk Factors: Reports: None


Cardiac Risk Factors: Reports: Hypertension


Ectopic Pregnancy Risk Factors: Reports: None


Ovarian Torsion Risk Factors: Reports: None


Surgical Obstruction Risk Factors: Reports: None


Related Surgical History: Reports: None


Patient Rh Status: Unknown


Abdominal Findings: Present: None


Differential Diagnoses: Appendicitis, Bowel Obstruction, Constipation, 

Diverticulitis, Gastroenteritis, Pancreatitis





Review of Systems





- Review Of Systems


Constitutional: Reports: No symptoms


Eyes: Reports: No symptoms


Ears, Nose, Mouth, Throat: Reports: No symptoms


Respiratory: Reports: No symptoms


Cardiac: Reports: No symptoms


GI: Reports: Abdominal pain


: Reports: No symptoms


Musculoskeletal: Reports: No symptoms


Skin: Reports: No symptoms


Neurological: Reports: No symptoms


Endocrine: Reports: No symptoms


Hematologic/Lymphatic: Reports: No symptoms


All Other Systems: Reviewed and Negative





Past Medical History





- Past Medical History


Previously Healthy: Yes


Endocrine: Reports: Hypothyroid


Cardiovascular: Reports: Hypertension


Respiratory: Reports: COPD, Asthma


Hematological: Reports: Anemia


Gastrointestinal: Reports: GERD (nexium ), Pancreatitis (stents with 

pancreatitis-chronic pancreatitis)


Genitourinary: Reports: None


Neuro/Psych: Reports: Migraine, Seizure, Anxiety.  Denies: Depression (

fluoxetine)


Musculoskeletal: Reports: Arthritis, Back Pain, Other (soma phenergan)


Cancer: Reports: None


Last Menstrual Period: none


Other Pertinent Past Medical History: Chronic back pain ,intrathecal pain pump 

and removal MEDIPORT





- Surgical History


General Surgical History: Reports: Hysterectomy (PARTIAL HYSTERECTOMY 2010), C-

section, Cholecystectomy, Tonsillectomy, Adenoidectomy, Orthopedic (bilat. feet 

surg;RIGHT AND LEFT FOOT SURGERY), Other (stents with pancreatitis-chronic 

pancreatitis, MEDIPORT )





- Family History


Family History: Reports: None





- Social History


Smoking Status: Never smoker


Hx Substance Use: No


Alcohol Screening: Occasionally





- Immunizations


Influenza Vaccine within 12 Months: No


Pneumococcal Vaccine up to Date: No





Physical Exam





- Physical Exam


Appearance: Well-appearing, No pain distress, Well-nourished


Eyes: YEIMY, EOMI, Conjunctiva clear


ENT: Ears normal, Nose normal, Oropharynx normal


Respiratory: Airway patent, Breath sounds clear, Breath sounds equal, 

Respirations nonlabored


Cardiovascular: RRR, Pulses normal, No rub, No murmur


GI/: Soft, Nontender, No masses, Bowel sounds normal, No Organomegaly


Musculoskeletal: Normal strength, ROM intact, No edema, No calf tenderness


Skin: Warm, Dry, Normal color


Neurological: Sensation intact, Motor intact, Reflexes intact, Cranial nerves 

intact, Alert, Oriented


Psychiatric: Affect appropriate, Mood appropriate





Critical Care Note





- Critical Care Note


Total Time (mins): 0





Course





- Course


Orders, Labs, Meds: 





Orders











 Category Date Time Status


 


 ED IV/MEDIPORT/POWERPORT .ONCE EMERGENCY  04/04/18 17:29 Active


 


 CBC W/ AUTO DIFF Stat LAB  04/04/18 17:29 Ordered


 


 COMPREHENSIVE METABOLIC PANEL Stat LAB  04/04/18 17:29 Ordered


 


 0.9 % Sodium Chloride [Saline Flush] MEDS  04/04/18 17:29 Ordered





 1 syr IVF PRN PRN   


 


 Dicyclomine Inj [Bentyl] MEDS  04/04/18 17:30 Discontinued





 20 mg IM ONCE STA   


 


 Hydromorphone HCl [Dilaudid 1 mg/ml Syringe] MEDS  04/04/18 17:30 Discontinued





 0.5 mg IVP ONCE STA   


 


 Sodium Chloride 0.9% [Sodium Chloride] 1,000 ml MEDS  04/04/18 17:30 Active





 IV BOLUS   


 


 CT ABDOMEN/PELVIS WO CONTRAST Stat RADS  04/04/18 17:29 Ordered








Medications











Generic Name Dose Route Start Last Admin





  Trade Name Freq  PRN Reason Stop Dose Admin


 


Sodium Chloride  1,000 mls @ 1,000 mls/hr  04/04/18 17:30  





  Sodium Chloride  IV  04/04/18 18:29  





  BOLUS STA   


 


Sodium Chloride  1 syr  04/04/18 17:29  





  Saline Flush  IVF   





  PRN PRN   





  To flush IV   














Discontinued Medications














Generic Name Dose Route Start Last Admin





  Trade Name Freq  PRN Reason Stop Dose Admin


 


Dicyclomine HCl  20 mg  04/04/18 17:30  





  Bentyl  IM  04/04/18 17:31  





  ONCE STA   


 


Hydromorphone HCl  0.5 mg  04/04/18 17:30  





  Dilaudid 1 Mg/Ml Syringe  IVP  04/04/18 17:31  





  ONCE STA   











Vital Signs: 





 











  Temp Pulse Resp BP Pulse Ox


 


 04/04/18 17:17  97.5 F L  108 H  20  142/96 H  92 L














Departure





- Departure


Time of Disposition: 19:00


Disposition: HOME SELF-CARE


Discharge Problem: 


 Abdominal pain





Instructions:  Abdominal Pain (ED)


Condition: Good


Pt referred to PMD for follow-up: Yes


IPMP verified?: No


Additional Instructions: 


Please call your Family Physician as soon as possible to schedule a follow-up 

appointment.


Prescriptions: 


Hydrocodone/Acetaminophen [Norco  Tablet] 1 each PO Q8HR #14 tablet


Allergies/Adverse Reactions: 


Allergies





divalproex sodium [From Depakote] Adverse Reaction (Verified 04/04/18 17:20)


 


ketorolac [From Toradol] Adverse Reaction (Verified 04/04/18 17:20)


 


metoclopramide HCl [From Reglan] Adverse Reaction (Verified 04/04/18 17:20)


 








Home Medications: 


Ambulatory Orders





Carisoprodol [Soma] 350 mg PO PRN PRN 09/02/15 


Amitriptyline HCl [Elavil] 100 mg PO DAILY 01/25/17 


Doxepin HCl [Sinequan] 25 mg PO BEDTIME 03/01/17 


Esomeprazole Magnesium [Nexium] 20 mg PO DAILY 05/16/17 


Promethazine HCl [Phenergan Tab] 25 mg PO Q6H PRN #15 tablet 08/14/17 


Pregabalin [Lyrica] 75 mg PO DAILY 12/05/17 


Topiramate [Topamax] 200 mg PO BID 03/08/18 


Butorphanol Tartrate 10 mg NS AS DIRECTED PRN 03/23/18 


Hydrocodone/Acetaminophen [Norco  Tablet] 1 each PO Q8HR #14 tablet 04/04/ 18

## 2018-04-04 NOTE — CT
Exam:  CT abdomen pelvis without intravenous contrast. 

  

Comparison:  07/19/2017. 

  

Reason for exam:  History of gallbladder and foot surgery with partial hysterectomy. 

  

Patient with abdominal pain. 

  

FINDINGS:  No pleural effusion, or focal consolidation in the partially imaged lung bases. 

  

Image interpretation is limited by the lack of intravenous contrast administration. 

  

The liver is lower in attenuation in the spleen.  The gallbladder has been removed. 

  

There is a small amount of pneumobilia. 

  

The adrenal glands and pancreas appear grossly unremarkable without intravenous contrast. 

  

The gallbladder has been removed. 

  

Calcific densities are seen in the left renal collecting system measuring up to 2.2 mm. 

  

No hydronephrosis or hydroureter is seen in either kidney. 

  

The bladder appears grossly unremarkable. 

  

No focal small bowel dilatation or transition point. 

  

No intra-abdominal free air or pelvic free fluid. 

  

The appendix is not definitively seen.  No inflammatory changes are seen in the expected location of 
the appendix. 

  

Degenerative disease is seen in the lumbosacral spine.  No suspicious appearing osteoblastic or osteo
lytic lesions. 

  

Impression: 

1. No acute inflammatory changes are seen within the abdomen or pelvis. 

2.  Left-sided nephrolithiasis without hydronephrosis.

## 2018-04-16 ENCOUNTER — HOSPITAL ENCOUNTER (EMERGENCY)
Dept: HOSPITAL 58 - ED | Age: 48
Discharge: HOME | End: 2018-04-16

## 2018-04-16 VITALS — TEMPERATURE: 97 F | SYSTOLIC BLOOD PRESSURE: 121 MMHG | DIASTOLIC BLOOD PRESSURE: 83 MMHG

## 2018-04-16 VITALS — BODY MASS INDEX: 35.2 KG/M2

## 2018-04-16 DIAGNOSIS — M25.562: Primary | ICD-10-CM

## 2018-04-16 PROCEDURE — 99283 EMERGENCY DEPT VISIT LOW MDM: CPT

## 2018-04-16 NOTE — DI
EXAM:  Left knee four views 

  

HISTORY:  Pain 

  

COMPARISON:  None 

  

FINDINGS:  The bones are normal. The medial, lateral, and patellofemoral compartments are normal in h
eight. No joint effusion. 

  

IMPERSSION:  Normal examination.

## 2018-04-16 NOTE — ED.PDOC
General


ED Provider: 


Dr. ALE MANLEY





Chief Complaint: Knee Pain/Injury


Stated Complaint: left knee pain


Time Seen by Physician: 15:23


Mode of Arrival: Wheelchair


Information Source: Patient


Exam Limitations: No limitations


[facility] 


[facility]





Nursing and Triage Documentation Reviewed and Agree: Yes


Reviewed sepsis parameters & appropriate labs ordered?: Yes (fell  earlier )


System Inflammatory Response Syndrome: Not Applicable


Sepsis Protocol: 


For patient's 13 years and over:





Temp is 96.8 and below  and greater


Pulse >90 BPM


Resp >20/minute


Acutely Altered Mental Status





Are patient's symptoms suggestive of a new infection, such as:


   -Pneumonia


   -Skin, Soft Tissue


   -Endocarditis


   -UTI


   -Bone, Joint Infection


   -Implantable Device


   -Acute Abdominal Infection


   -Wound Infection


   -Meningitis


   -Blood Stream Catheter Infection


   -Unknown





System Inflammatory Response Syndrome: Not Applicable





Review of Systems





- Review Of Systems


Constitutional: Reports: No symptoms


Eyes: Reports: No symptoms


Ears, Nose, Mouth, Throat: Reports: No symptoms


Respiratory: Reports: No symptoms


Cardiac: Reports: No symptoms


GI: Reports: No symptoms


: Reports: No symptoms


Musculoskeletal: Reports: Joint pain (left knee)


Skin: Reports: No symptoms


Neurological: Reports: No symptoms


Endocrine: Reports: No symptoms


Hematologic/Lymphatic: Reports: No symptoms


All Other Systems: Reviewed and Negative





Past Medical History





- Past Medical History


Previously Healthy: Yes


Endocrine: Reports: Hypothyroid


Cardiovascular: Reports: Hypertension


Respiratory: Reports: COPD, Asthma


Hematological: Reports: Anemia


Gastrointestinal: Reports: GERD (nexium ), Pancreatitis (stents with 

pancreatitis-chronic pancreatitis)


Genitourinary: Reports: None


Neuro/Psych: Reports: Migraine, Seizure, Anxiety.  Denies: Depression (

fluoxetine)


Musculoskeletal: Reports: Arthritis, Back Pain, Other (soma phenergan)


Cancer: Reports: None


Last Menstrual Period: n/a


Other Pertinent Past Medical History: Chronic back pain ,intrathecal pain pump 

and removal MEDIPORT





- Surgical History


General Surgical History: Reports: Hysterectomy (PARTIAL HYSTERECTOMY 2010), C-

section, Cholecystectomy, Tonsillectomy, Adenoidectomy, Orthopedic (bilat. feet 

surg;RIGHT AND LEFT FOOT SURGERY), Other (stents with pancreatitis-chronic 

pancreatitis, MEDIPORT )





- Family History


Family History: Reports: None





- Social History


Smoking Status: Never smoker


Hx Substance Use: No


Alcohol Screening: Occasionally





- Immunizations


Influenza Vaccine within 12 Months: No


Pneumococcal Vaccine up to Date: No





Physical Exam





- Physical Exam


Appearance: Well-appearing, No pain distress, Well-nourished


Eyes: YEIMY, EOMI, Conjunctiva clear


ENT: Ears normal, Nose normal, Oropharynx normal


Respiratory: Airway patent, Breath sounds clear, Breath sounds equal, 

Respirations nonlabored


Cardiovascular: RRR, Pulses normal, No rub, No murmur


GI/: Soft, Nontender, No masses, Bowel sounds normal, No Organomegaly


Musculoskeletal: Normal strength, ROM intact, No edema, No calf tenderness


Skin: Warm, Dry, Normal color


Neurological: Sensation intact, Motor intact, Reflexes intact, Cranial nerves 

intact, Alert, Oriented


Psychiatric: Affect appropriate, Mood appropriate





Interpretation





- Radiology Interpretation


Radiology Interpretation By: Radiologist


Radiology Results: No acute changes





Critical Care Note





- Critical Care Note


Total Time (mins): 0





Course





- Course


Orders, Labs, Meds: 





Orders











 Category Date Time Status


 


 KNEE, LEFT 4 VIEWS Stat RADS  04/16/18 16:36 Completed











Vital Signs: 





 











  Temp Pulse Resp BP Pulse Ox


 


 04/16/18 15:21  97.0 F L  102 H  16  121/83  95














Departure





- Departure


Time of Disposition: 17:27


Disposition: HOME SELF-CARE


Discharge Problem: 


 Knee pain, Injury of knee





Instructions:  Knee Pain (ED), Arthralgia (ED)


Condition: Good


Pt referred to PMD for follow-up: Yes


IPMP verified?: No


Allergies/Adverse Reactions: 


Allergies





divalproex sodium [From Depakote] Adverse Reaction (Verified 04/16/18 15:26)


 


ketorolac [From Toradol] Adverse Reaction (Verified 04/16/18 15:26)


 


metoclopramide HCl [From Reglan] Adverse Reaction (Verified 04/16/18 15:26)


 








Home Medications: 


Ambulatory Orders





Carisoprodol [Soma] 350 mg PO PRN PRN 09/02/15 


Amitriptyline HCl [Elavil] 100 mg PO DAILY 01/25/17 


Doxepin HCl [Sinequan] 25 mg PO BEDTIME 03/01/17 


Esomeprazole Magnesium [Nexium] 20 mg PO DAILY 05/16/17 


Promethazine HCl [Phenergan Tab] 25 mg PO Q6H PRN #15 tablet 08/14/17 


Pregabalin [Lyrica] 75 mg PO DAILY 12/05/17 


Topiramate [Topamax] 200 mg PO BID 03/08/18 


Butorphanol Tartrate 10 mg NS AS DIRECTED PRN 03/23/18

## 2018-05-11 ENCOUNTER — HOSPITAL ENCOUNTER (EMERGENCY)
Dept: HOSPITAL 58 - ED | Age: 48
Discharge: HOME | End: 2018-05-11

## 2018-05-11 VITALS — DIASTOLIC BLOOD PRESSURE: 93 MMHG | TEMPERATURE: 97.4 F | SYSTOLIC BLOOD PRESSURE: 133 MMHG

## 2018-05-11 VITALS — BODY MASS INDEX: 34.8 KG/M2

## 2018-05-11 DIAGNOSIS — Z79.899: ICD-10-CM

## 2018-05-11 DIAGNOSIS — I10: ICD-10-CM

## 2018-05-11 DIAGNOSIS — G43.909: Primary | ICD-10-CM

## 2018-05-11 PROCEDURE — 96372 THER/PROPH/DIAG INJ SC/IM: CPT

## 2018-05-11 PROCEDURE — 99282 EMERGENCY DEPT VISIT SF MDM: CPT

## 2018-05-11 NOTE — ED.PDOC
General


ED Provider: 


Dr. ALE MANLEY





Chief Complaint: Headache


Stated Complaint: chronic headache


Time Seen by Physician: 14:30 (seen with tomás at all times  also present

)


Mode of Arrival: Walk-In


Information Source: Patient


Exam Limitations: No limitations


Primary Care Provider: 


ALVARADO MCALLISTER





Nursing and Triage Documentation Reviewed and Agree: Yes


Reviewed sepsis parameters & appropriate labs ordered?: Yes


System Inflammatory Response Syndrome: Not Applicable


Sepsis Protocol: 


For patient's 13 years and over:





Temp is 96.8 and below  and greater


Pulse >90 BPM


Resp >20/minute


Acutely Altered Mental Status





Are patient's symptoms suggestive of a new infection, such as:


   -Pneumonia


   -Skin, Soft Tissue


   -Endocarditis


   -UTI


   -Bone, Joint Infection


   -Implantable Device


   -Acute Abdominal Infection


   -Wound Infection


   -Meningitis


   -Blood Stream Catheter Infection


   -Unknown





System Inflammatory Response Syndrome: Not Applicable





Neurological Complaint Exam





- Headache Complaint/Exam


Onset: Gradual


Duration: 1 day


Symptoms Are: Still present


Timing: Intermittent


Episodes Lasting: Hours


Worst Headache Ever: No


Initial Severity: Moderate


Current Severity: Moderate


Location: Frontal, Temporal


Character: Reports: Throbbing


Aggravating: Reports: Exertion


Alleviating: Reports: Rest


Associated Signs and Symptoms: Reports: Nausea.  Denies: Dizziness, Seizure, 

Vomiting, Sinus pressure, Fever, Neck pain, Neck stiffness, Decreased LOC, 

Visual changes


Related History: Reports: Similar episode


Related Surgical History: Reports: None


SAH Risk Factors: Reports: Hypertension


Meningitis Risk Factors: Reports: None


SDH Risk Factors: Reports: None


Temporal Arteritis Risk Factors: Reports: Female, 


Normal Head CT Within Last 12 Months: Yes


Fundoscopic Exam: Present: Normal Findings


Papilledema Present: No


Temporal Artery Tenderness: Present: None


Sinus Tenderness: Present: None


TMJ Tenderness: Present: None


Glascow Coma Scale (see protocol): 15


Meningeal Signs Positive: No


Pain on Passive Flexion-Positive Kernig's: No


ROM Limited In: No Limitiations


Focal Weakness: Present: None


Focal Sensory Loss: Present: None


Gait: Normal


Nystagmus Present: No


Gag Reflex Present: Yes


Differential Diagnoses: Migraine





Review of Systems





- Review Of Systems


Constitutional: Reports: No symptoms


Eyes: Reports: No symptoms


Ears, Nose, Mouth, Throat: Reports: No symptoms


Respiratory: Reports: No symptoms


Cardiac: Reports: No symptoms


GI: Reports: No symptoms


: Reports: No symptoms


Musculoskeletal: Reports: No symptoms


Skin: Reports: No symptoms


Neurological: Reports: Headache


Endocrine: Reports: No symptoms


Hematologic/Lymphatic: Reports: No symptoms


All Other Systems: Reviewed and Negative





Past Medical History





- Past Medical History


Previously Healthy: Yes


Endocrine: Reports: Hypothyroid


Cardiovascular: Reports: Hypertension


Respiratory: Reports: COPD, Asthma


Hematological: Reports: Anemia


Gastrointestinal: Reports: GERD (nexium ), Pancreatitis (stents with 

pancreatitis-chronic pancreatitis)


Genitourinary: Reports: None


Neuro/Psych: Reports: Migraine, Seizure, Anxiety.  Denies: Depression (

fluoxetine)


Musculoskeletal: Reports: Arthritis, Back Pain, Other (soma phenergan)


Cancer: Reports: None


Last Menstrual Period: none


Other Pertinent Past Medical History: Chronic back pain ,intrathecal pain pump 

and removal MEDIPORT





- Surgical History


General Surgical History: Reports: Hysterectomy (PARTIAL HYSTERECTOMY 2010), C-

section, Cholecystectomy, Tonsillectomy, Adenoidectomy, Orthopedic (bilat. feet 

surg;RIGHT AND LEFT FOOT SURGERY), Other (stents with pancreatitis-chronic 

pancreatitis, MEDIPORT )





- Family History


Family History: Reports: None





- Social History


Smoking Status: Never smoker


Hx Substance Use: No


Alcohol Screening: Occasionally





- Immunizations


Influenza Vaccine within 12 Months: No


Pneumococcal Vaccine up to Date: No





Physical Exam





- Physical Exam


Appearance: Well-appearing, No pain distress, Well-nourished


Eyes: YEIMY, EOMI, Conjunctiva clear


ENT: Ears normal, Nose normal, Oropharynx normal


Respiratory: Airway patent, Breath sounds clear, Breath sounds equal, 

Respirations nonlabored


Cardiovascular: RRR, Pulses normal, No rub, No murmur


GI/: Soft, Nontender, No masses, Bowel sounds normal, No Organomegaly


Musculoskeletal: Normal strength, ROM intact, No edema, No calf tenderness


Skin: Warm, Dry, Normal color


Neurological: Sensation intact, Motor intact, Reflexes intact, Cranial nerves 

intact, Alert, Oriented


Psychiatric: Affect appropriate, Mood appropriate





Critical Care Note





- Critical Care Note


Total Time (mins): 0





Course





- Course


Orders, Labs, Meds: 





Orders











 Category Date Time Status


 


 Morphine Sulfate [Morphine 10 mg/ml Syringe] MEDS  05/11/18 14:37 Stat





 8 mg IM ONCE STA   


 


 Ondansetron HCl/Pf [Zofran 4 mg/2 ml] MEDS  05/11/18 14:38 Stat





 8 mg IM ONCE STA   








Medications














Discontinued Medications














Generic Name Dose Route Start Last Admin





  Trade Name Freq  PRN Reason Stop Dose Admin


 


Morphine Sulfate  8 mg  05/11/18 14:37  





  Morphine 10 Mg/Ml Syringe  IM  05/11/18 14:38  





  ONCE STA   





     





     





     





     











Vital Signs: 





 











  Temp Pulse Resp BP Pulse Ox


 


 05/11/18 14:26  97.4 F L  120 H  20  133/93 H  92 L














Departure





- Departure


Time of Disposition: 15:00


Disposition: HOME SELF-CARE


Discharge Problem: 


 Headache





Migraine


Qualifiers:


 Migraine type: unspecified Status migrainosus presence: without status 

migrainosus Intractability: not intractable Qualified Code(s): G43.909 - 

Migraine, unspecified, not intractable, without status migrainosus





Condition: Good


Pt referred to PMD for follow-up: Yes


IPMP verified?: No


Allergies/Adverse Reactions: 


Allergies





latex Allergy (Verified 05/11/18 14:31)


 


divalproex sodium [From Depakote] Adverse Reaction (Verified 05/11/18 14:31)


 


ketorolac [From Toradol] Adverse Reaction (Verified 05/11/18 14:31)


 


metoclopramide HCl [From Reglan] Adverse Reaction (Verified 05/11/18 14:31)


 








Home Medications: 


Ambulatory Orders





Carisoprodol [Soma] 350 mg PO PRN PRN 09/02/15 


Amitriptyline HCl [Elavil] 100 mg PO DAILY 01/25/17 


Doxepin HCl [Sinequan] 25 mg PO BEDTIME 03/01/17 


Topiramate [Topamax] 200 mg PO BID 03/08/18 


Esomeprazole Magnesium [Nexium 24hr] 20 mg PO BID PRN 05/01/18

## 2018-06-19 ENCOUNTER — HOSPITAL ENCOUNTER (OUTPATIENT)
Dept: HOSPITAL 58 - FCC-LAB | Age: 48
Discharge: HOME | End: 2018-06-19
Attending: FAMILY MEDICINE

## 2018-06-19 VITALS — BODY MASS INDEX: 34.8 KG/M2

## 2018-06-19 DIAGNOSIS — Z86.2: Primary | ICD-10-CM

## 2018-06-19 PROCEDURE — 84439 ASSAY OF FREE THYROXINE: CPT

## 2018-06-19 PROCEDURE — 36415 COLL VENOUS BLD VENIPUNCTURE: CPT

## 2018-06-19 PROCEDURE — 84443 ASSAY THYROID STIM HORMONE: CPT

## 2018-06-19 PROCEDURE — 85025 COMPLETE CBC W/AUTO DIFF WBC: CPT

## 2018-06-20 ENCOUNTER — HOSPITAL ENCOUNTER (OUTPATIENT)
Dept: HOSPITAL 58 - CAR | Age: 48
Discharge: HOME | End: 2018-06-20
Attending: FAMILY MEDICINE

## 2018-06-20 VITALS — BODY MASS INDEX: 34.8 KG/M2

## 2018-06-20 DIAGNOSIS — J45.909: Primary | ICD-10-CM

## 2018-06-20 DIAGNOSIS — R06.02: ICD-10-CM

## 2018-07-18 ENCOUNTER — HOSPITAL ENCOUNTER (EMERGENCY)
Dept: HOSPITAL 58 - ED | Age: 48
Discharge: HOME | End: 2018-07-18

## 2018-07-18 VITALS — DIASTOLIC BLOOD PRESSURE: 94 MMHG | TEMPERATURE: 99.3 F | SYSTOLIC BLOOD PRESSURE: 143 MMHG

## 2018-07-18 VITALS — BODY MASS INDEX: 34.2 KG/M2

## 2018-07-18 DIAGNOSIS — G43.909: Primary | ICD-10-CM

## 2018-07-18 PROCEDURE — 96372 THER/PROPH/DIAG INJ SC/IM: CPT

## 2018-07-18 PROCEDURE — 99282 EMERGENCY DEPT VISIT SF MDM: CPT

## 2018-07-18 NOTE — ED.PDOC
General


ED Provider: 


Dr. DREAD CISNEROS





Chief Complaint: Headache


Stated Complaint: Migraine HA


Time Seen by Physician: 16:28


Mode of Arrival: Walk-In


Information Source: Patient


Primary Care Provider: 


ALVARADO MCALLISTER





Nursing and Triage Documentation Reviewed and Agree: Yes


Does patient meet sepsis criteria?: No


System Inflammatory Response Syndrome: Not Applicable


Sepsis Protocol: 


For patient's 13 years and over:





Temp is 96.8 and below  and greater


Pulse >90 BPM


Resp >20/minute


Acutely Altered Mental Status





Are patient's symptoms suggestive of a new infection, such as:


   -Pneumonia


   -Skin, Soft Tissue


   -Endocarditis


   -UTI


   -Bone, Joint Infection


   -Implantable Device


   -Acute Abdominal Infection


   -Wound Infection


   -Meningitis


   -Blood Stream Catheter Infection


   -Unknown








Review of Systems





- Review Of Systems


Constitutional: Reports: Malaise


Eyes: Reports: Photophobia


Ears, Nose, Mouth, Throat: Reports: No symptoms


Respiratory: Reports: No symptoms.  Denies: Cough


Cardiac: Reports: No symptoms.  Denies: Chest pain


GI: Reports: Nausea, Vomiting (with usual Migrain presentation)


Neurological: Reports: No symptoms


All Other Systems: Reviewed and Negative





Past Medical History





- Past Medical History


Previously Healthy: Yes


Endocrine: Reports: Hypothyroid


Cardiovascular: Reports: Hypertension


Respiratory: Reports: COPD, Asthma


Hematological: Reports: Anemia


Gastrointestinal: Reports: GERD (nexium ), Pancreatitis (stents with 

pancreatitis-chronic pancreatitis)


Genitourinary: Reports: None


Neuro/Psych: Reports: Migraine, Seizure, Anxiety.  Denies: Depression (

fluoxetine)


Musculoskeletal: Reports: Arthritis, Back Pain, Other (soma phenergan)


Cancer: Reports: None


Last Menstrual Period: NA


Other Pertinent Past Medical History: Chronic back pain ,intrathecal pain pump 

and removal MEDIPORT





- Surgical History


General Surgical History: Reports: Hysterectomy (PARTIAL HYSTERECTOMY 2010), C-

section, Cholecystectomy, Tonsillectomy, Adenoidectomy, Orthopedic (bilat. feet 

surg;RIGHT AND LEFT FOOT SURGERY), Other (stents with pancreatitis-chronic 

pancreatitis, MEDIPORT )





- Family History


Family History: Reports: None





- Social History


Smoking Status: Never smoker


Hx Substance Use: No


Alcohol Screening: Occasionally





- Immunizations


Tetanus Shot up to Date: Yes


Influenza Vaccine within 12 Months: No


Pneumococcal Vaccine up to Date: No





Physical Exam





- Physical Exam


Appearance: Ill-appearing


Ill-appearing: Mild


Pain Distress: Mild


Eyes: YEIMY, EOMI, Right pupil size (6 mm), Left pupil size (6 mm)


Neck: Supple (Chin to chest normal)


Respiratory: Airway patent, Respirations nonlabored


Skin: Warm, Dry, Normal color


Neurological: Sensation intact, Motor intact, Alert, Oriented


Psychiatric: Affect appropriate, Mood appropriate





Critical Care Note





- Critical Care Note


Total Time (mins): 20





Course





- Course


Orders, Labs, Meds: 


Orders











 Category Date Time Status


 


 Butorphanol Tartrate [Stadol] MEDS  07/18/18 16:29 Discontinued





 2 mg IM ONCE STA   


 


 Diphenhydramine Inj [Benadryl] MEDS  07/18/18 16:32 Discontinued





 25 mg IM ONCE STA   


 


 Promethazine HCl [Phenergan 25 mg/ml Vial] MEDS  07/18/18 16:31 Discontinued





 25 mg IM ONCE STA   








Medications














Discontinued Medications














Generic Name Dose Route Start Last Admin





  Trade Name Freq  PRN Reason Stop Dose Admin


 


Butorphanol Tartrate  2 mg  07/18/18 16:29  07/18/18 16:43





  Stadol  IM  07/18/18 16:30  2 mg





  ONCE STA   Administration





     





     





     





     


 


Diphenhydramine HCl  25 mg  07/18/18 16:32  07/18/18 16:46





  Benadryl  IM  07/18/18 16:33  25 mg





  ONCE STA   Administration





     





     





     





     


 


Promethazine HCl  25 mg  07/18/18 16:31  07/18/18 16:47





  Phenergan 25 Mg/Ml Vial  IM  07/18/18 16:32  25 mg





  ONCE STA   Administration





     





     





     





     











Vital Signs: 


 











  Temp Pulse Resp BP Pulse Ox


 


 07/18/18 15:46  99.3 F  114 H  16  143/94 H  92 L














Departure





- Departure


Time of Disposition: 17:19


Disposition: HOME SELF-CARE


Discharge Problem: 


Migraine


Qualifiers:


 Migraine type: unspecified 





Instructions:  Migraine Headache (ED)


Condition: Good


Pt referred to PMD for follow-up: Yes (Call for appointment)


IPMP verified?: No (No narcotic prescribed)


Additional Instructions: 


Take your usual medications; follow up with primary care provider.


Allergies/Adverse Reactions: 


Allergies





latex Allergy (Verified 07/18/18 15:54)


 


divalproex sodium [From Depakote] Adverse Reaction (Verified 07/18/18 15:54)


 


ketorolac [From Toradol] Adverse Reaction (Verified 07/18/18 15:54)


 


metoclopramide HCl [From Reglan] Adverse Reaction (Verified 07/18/18 15:54)


 








Home Medications: 


Ambulatory Orders





Carisoprodol [Soma] 350 mg PO PRN PRN 09/02/15 


Amitriptyline HCl [Elavil] 100 mg PO DAILY 01/25/17 


Esomeprazole Magnesium [Nexium 24hr] 20 mg PO BID PRN 05/01/18 


Hydrocodone/Acetaminophen [Norco 5-325 Tablet] 1 each PO Q6HR PRN 05/29/18 


Amitriptyline HCl 50 mg PO DAILY 07/13/18 








Disposition Discussed With: Patient

## 2018-07-23 ENCOUNTER — HOSPITAL ENCOUNTER (OUTPATIENT)
Dept: HOSPITAL 58 - FCC-LAB | Age: 48
Discharge: HOME | End: 2018-07-23
Attending: FAMILY MEDICINE

## 2018-07-23 VITALS — BODY MASS INDEX: 34.2 KG/M2

## 2018-07-23 DIAGNOSIS — R11.2: Primary | ICD-10-CM

## 2018-07-23 PROCEDURE — 85025 COMPLETE CBC W/AUTO DIFF WBC: CPT

## 2018-07-23 PROCEDURE — 80053 COMPREHEN METABOLIC PANEL: CPT

## 2018-07-23 PROCEDURE — 36415 COLL VENOUS BLD VENIPUNCTURE: CPT

## 2018-07-24 ENCOUNTER — HOSPITAL ENCOUNTER (OUTPATIENT)
Dept: HOSPITAL 58 - FCC-LAB | Age: 48
Discharge: HOME | End: 2018-07-24
Attending: FAMILY MEDICINE

## 2018-07-24 VITALS — BODY MASS INDEX: 34.2 KG/M2

## 2018-07-24 DIAGNOSIS — R11.2: Primary | ICD-10-CM

## 2018-07-24 PROCEDURE — 36415 COLL VENOUS BLD VENIPUNCTURE: CPT

## 2018-07-24 PROCEDURE — 87493 C DIFF AMPLIFIED PROBE: CPT

## 2018-07-29 ENCOUNTER — HOSPITAL ENCOUNTER (EMERGENCY)
Dept: HOSPITAL 58 - ED | Age: 48
Discharge: HOME | End: 2018-07-29

## 2018-07-29 VITALS — DIASTOLIC BLOOD PRESSURE: 81 MMHG | TEMPERATURE: 97.7 F | SYSTOLIC BLOOD PRESSURE: 142 MMHG

## 2018-07-29 VITALS — BODY MASS INDEX: 34.2 KG/M2

## 2018-07-29 DIAGNOSIS — G43.909: Primary | ICD-10-CM

## 2018-07-29 PROCEDURE — 99283 EMERGENCY DEPT VISIT LOW MDM: CPT

## 2018-07-29 PROCEDURE — 96372 THER/PROPH/DIAG INJ SC/IM: CPT

## 2018-07-29 NOTE — ED.PDOC
General


ED Provider: 


Dr. YINA JENSEN-ER





Chief Complaint: Headache


Stated Complaint: sal got a migraine


Time Seen by Physician: 14:22


Mode of Arrival: Walk-In


Information Source: Patient


Exam Limitations: No limitations


Primary Care Provider: 


ALVARADO MCALLISTER





Nursing and Triage Documentation Reviewed and Agree: Yes


Does patient meet sepsis criteria?: No


System Inflammatory Response Syndrome: Not Applicable


Sepsis Protocol: 


For patient's 13 years and over:





Temp is 96.8 and below  and greater


Pulse >90 BPM


Resp >20/minute


Acutely Altered Mental Status





Are patient's symptoms suggestive of a new infection, such as:


   -Pneumonia


   -Skin, Soft Tissue


   -Endocarditis


   -UTI


   -Bone, Joint Infection


   -Implantable Device


   -Acute Abdominal Infection


   -Wound Infection


   -Meningitis


   -Blood Stream Catheter Infection


   -Unknown








Neurological Complaint Exam





- Headache Complaint/Exam


Onset: Gradual


Duration: several hours


Symptoms Are: Still present


Timing: Constant


Worst Headache Ever: No


Initial Severity: Mild


Current Severity: Moderate


Location: Diffuse


Character: Reports: Dull, Throbbing, Pressure, Typical headache, Migraine


Aggravating: Reports: Bright lights


Alleviating: Reports: None


Associated Signs and Symptoms: Reports: Nausea.  Denies: Dizziness, Seizure


Related History: Reports: Similar episode.  Denies: Recent trauma, Remote trauma


Temporal Arteritis Risk Factors: Reports: Female, 


Normal Head CT Within Last 12 Months: Yes


Fundoscopic Exam: Present: Normal Findings


Papilledema Present: No


Temporal Artery Tenderness: Present: None


Sinus Tenderness: Present: None


TMJ Tenderness: Present: None


Glascow Coma Scale (see protocol): 15


Meningeal Signs Positive: No


Pain on Passive Flexion-Positive Kernig's: No


ROM Limited In: No Limitiations


Focal Weakness: Present: None


Focal Sensory Loss: Present: None


Gait: Normal


Nystagmus Present: No


Gag Reflex Present: Yes


Finger-to-Nose: Normal Findings


Romberg Test Positive: No


Babinski Sign: Negative Right, Negative Left


Heel to Toe Normal: Yes


Differential Diagnoses: Migraine





Review of Systems





- Review Of Systems


Constitutional: Reports: No symptoms


Eyes: Reports: No symptoms


Ears, Nose, Mouth, Throat: Reports: No symptoms


Respiratory: Reports: No symptoms


Cardiac: Reports: No symptoms


GI: Reports: Nausea


: Reports: No symptoms


Musculoskeletal: Reports: No symptoms


Skin: Reports: No symptoms


Neurological: Reports: Headache


Endocrine: Reports: No symptoms


Hematologic/Lymphatic: Reports: No symptoms


All Other Systems: Reviewed and Negative





Past Medical History





- Past Medical History


Previously Healthy: Yes


Endocrine: Reports: Hypothyroid


Cardiovascular: Reports: Hypertension


Respiratory: Reports: COPD, Asthma


Hematological: Reports: Anemia


Gastrointestinal: Reports: GERD (nexium ), Pancreatitis (stents with 

pancreatitis-chronic pancreatitis)


Genitourinary: Reports: None


Neuro/Psych: Reports: Migraine, Seizure, Anxiety.  Denies: Depression (

fluoxetine)


Musculoskeletal: Reports: Arthritis, Back Pain, Other (soma phenergan)


Cancer: Reports: None


Last Menstrual Period: hysterectomy


Other Pertinent Past Medical History: Chronic back pain ,intrathecal pain pump 

and removal MEDIPORT





- Surgical History


General Surgical History: Reports: Hysterectomy (PARTIAL HYSTERECTOMY 2010), C-

section, Cholecystectomy, Tonsillectomy, Adenoidectomy, Orthopedic (bilat. feet 

surg;RIGHT AND LEFT FOOT SURGERY), Other (stents with pancreatitis-chronic 

pancreatitis, MEDIPORT )





- Family History


Family History: Reports: None





- Social History


Smoking Status: Never smoker


Hx Substance Use: No


Alcohol Screening: Occasionally





- Immunizations


Influenza Vaccine within 12 Months: No


Pneumococcal Vaccine up to Date: No





Physical Exam





- Physical Exam


Appearance: Well-appearing, No pain distress, Well-nourished


Pain Distress: Moderate


Eyes: YEIMY, EOMI, Conjunctiva clear


ENT: Ears normal, Nose normal, Oropharynx normal


Neck: Supple


Respiratory: Airway patent, Breath sounds clear, Breath sounds equal, 

Respirations nonlabored


Cardiovascular: RRR, Pulses normal, No rub, No murmur


GI/: Soft, Nontender, No masses, Bowel sounds normal, No Organomegaly


Musculoskeletal: Normal strength


Skin: Warm


Neurological: Sensation intact, Motor intact, Reflexes intact, Cranial nerves 

intact, Alert, Oriented


Psychiatric: Affect appropriate, Mood appropriate





Re-Evaluation





- Re-Evaluation


Time of Re-Evaluation: 15:00


Status: Improved


Vital Signs Stable: Yes


Pain Level: 1


Appearance: NAD


Lungs: Clear


Skin: Warm and Dry


Neuro: Alert and Oriented X3


CV: RRR





Critical Care Note





- Critical Care Note


Total Time (mins): 0





Course





- Course


Orders, Labs, Meds: 


Orders











 Category Date Time Status


 


 Butorphanol Tartrate [Stadol] MEDS  07/29/18 14:21 Discontinued





 2 mg IM ONCE STA   


 


 Diphenhydramine Inj [Benadryl] MEDS  07/29/18 14:21 Discontinued





 50 mg IM ONCE STA   


 


 Promethazine HCl [Phenergan 25 mg/ml Vial] MEDS  07/29/18 14:21 Discontinued





 25 mg IM ONCE STA   








Medications














Discontinued Medications














Generic Name Dose Route Start Last Admin





  Trade Name Yoshi  PRN Reason Stop Dose Admin


 


Butorphanol Tartrate  2 mg  07/29/18 14:21  





  Stadol  IM  07/29/18 14:22  





  ONCE STA   





     





     





     





     


 


Diphenhydramine HCl  50 mg  07/29/18 14:21  





  Benadryl  IM  07/29/18 14:22  





  ONCE STA   





     





     





     





     


 


Promethazine HCl  25 mg  07/29/18 14:21  





  Phenergan 25 Mg/Ml Vial  IM  07/29/18 14:22  





  ONCE STA   





     





     





     





     








she says this ha is like the other migraine ha--no more severe in nature than 

usual ha


Vital Signs: 


 











  Temp Pulse Resp BP Pulse Ox


 


 07/29/18 14:09  97.7 F  110 H  16  142/81 H  92 L














Departure





- Departure


Time of Disposition: 15:10


Disposition: HOME SELF-CARE


Discharge Problem: 


Migraine


Qualifiers:


 Migraine type: unspecified Status migrainosus presence: without status 

migrainosus Intractability: not intractable Qualified Code(s): G43.909 - 

Migraine, unspecified, not intractable, without status migrainosus





Instructions:  Migraine Headache (ED)


Condition: Good


Pt referred to PMD for follow-up: No


IPMP verified?: No


Additional Instructions: 


f/u with pcp


Allergies/Adverse Reactions: 


Allergies





latex Allergy (Verified 07/29/18 14:13)


 


divalproex sodium [From Depakote] Adverse Reaction (Verified 07/29/18 14:13)


 


ketorolac [From Toradol] Adverse Reaction (Verified 07/29/18 14:13)


 


metoclopramide HCl [From Reglan] Adverse Reaction (Verified 07/29/18 14:13)


 








Home Medications: 


Ambulatory Orders





Carisoprodol [Soma] 350 mg PO PRN PRN 09/02/15 


Esomeprazole Magnesium [Nexium 24hr] 20 mg PO BID PRN 05/01/18 


Hydrocodone/Acetaminophen [Norco 5-325 Tablet] 1 each PO Q6HR PRN 05/29/18 


Amitriptyline HCl 50 mg PO TID 07/13/18 








Disposition Discussed With: Patient, Family

## 2018-08-08 ENCOUNTER — HOSPITAL ENCOUNTER (EMERGENCY)
Dept: HOSPITAL 58 - ED | Age: 48
Discharge: HOME | End: 2018-08-08

## 2018-08-08 VITALS — TEMPERATURE: 97.5 F | SYSTOLIC BLOOD PRESSURE: 127 MMHG | DIASTOLIC BLOOD PRESSURE: 94 MMHG

## 2018-08-08 VITALS — BODY MASS INDEX: 34.2 KG/M2

## 2018-08-08 DIAGNOSIS — G43.909: ICD-10-CM

## 2018-08-08 DIAGNOSIS — R51: Primary | ICD-10-CM

## 2018-08-08 PROCEDURE — 96372 THER/PROPH/DIAG INJ SC/IM: CPT

## 2018-08-08 PROCEDURE — 99283 EMERGENCY DEPT VISIT LOW MDM: CPT

## 2018-08-08 NOTE — ED.PDOC
General


ED Provider: 


Dr. ALE MANLEY





Chief Complaint: Headache


Stated Complaint: chronic headache


Time Seen by Physician: 17:37 (seen with MARTINE OWENS R.N. AND HILDA  at all 

times )


Mode of Arrival: Walk-In


Information Source: Patient


Exam Limitations: No limitations


Primary Care Provider: 


ALVARADO MCALLISTER





Nursing and Triage Documentation Reviewed and Agree: Yes


Does patient meet sepsis criteria?: No


System Inflammatory Response Syndrome: Not Applicable


Sepsis Protocol: 


For patient's 13 years and over:





Temp is 96.8 and below  and greater


Pulse >90 BPM


Resp >20/minute


Acutely Altered Mental Status





Are patient's symptoms suggestive of a new infection, such as:


   -Pneumonia


   -Skin, Soft Tissue


   -Endocarditis


   -UTI


   -Bone, Joint Infection


   -Implantable Device


   -Acute Abdominal Infection


   -Wound Infection


   -Meningitis


   -Blood Stream Catheter Infection


   -Unknown








Neurological Complaint Exam





- Headache Complaint/Exam


Onset: Gradual


Duration: 1 day


Symptoms Are: Still present


Timing: Intermittent


Episodes Lasting: Hours


Worst Headache Ever: No


Initial Severity: Moderate


Current Severity: Moderate


Location: Frontal, Temporal


Character: Reports: Throbbing


Aggravating: Reports: Bright lights


Alleviating: Reports: Rest


Associated Signs and Symptoms: Denies: Dizziness, Seizure, Nausea, Vomiting, 

Sinus pressure, Fever, Neck pain, Neck stiffness, Decreased LOC, Visual changes


Related History: Reports: Similar episode


Related Surgical History: Reports: None


SAH Risk Factors: Reports: None


Meningitis Risk Factors: Reports: None


SDH Risk Factors: Reports: None


Temporal Arteritis Risk Factors: Reports: Female, 


Normal Head CT Within Last 12 Months: Yes


Fundoscopic Exam: Present: Normal Findings


Papilledema Present: No


Temporal Artery Tenderness: Present: None


Sinus Tenderness: Present: None


TMJ Tenderness: Present: None


Glascow Coma Scale (see protocol): 15


Meningeal Signs Positive: No


ROM Limited In: No Limitiations


Focal Weakness: Present: None


Focal Sensory Loss: Present: None


Gait: Normal


Gag Reflex Present: Yes


Differential Diagnoses: Migraine





Review of Systems





- Review Of Systems


Constitutional: Reports: No symptoms


Eyes: Reports: No symptoms


Ears, Nose, Mouth, Throat: Reports: No symptoms


Respiratory: Reports: No symptoms


Cardiac: Reports: No symptoms


GI: Reports: No symptoms


: Reports: No symptoms


Musculoskeletal: Reports: No symptoms


Skin: Reports: No symptoms


Neurological: Reports: Headache


Endocrine: Reports: No symptoms


Hematologic/Lymphatic: Reports: No symptoms


All Other Systems: Reviewed and Negative





Past Medical History





- Past Medical History


Previously Healthy: Yes


Endocrine: Reports: Hypothyroid


Cardiovascular: Reports: Hypertension


Respiratory: Reports: COPD, Asthma


Hematological: Reports: Anemia


Gastrointestinal: Reports: GERD (nexium ), Pancreatitis (stents with 

pancreatitis-chronic pancreatitis)


Genitourinary: Reports: None


Neuro/Psych: Reports: Migraine, Seizure, Anxiety.  Denies: Depression (

fluoxetine)


Musculoskeletal: Reports: Arthritis, Back Pain, Other (soma phenergan)


Cancer: Reports: None


Last Menstrual Period: none


Other Pertinent Past Medical History: Chronic back pain ,intrathecal pain pump 

and removal MEDIPORT





- Surgical History


General Surgical History: Reports: Hysterectomy (PARTIAL HYSTERECTOMY 2010), C-

section, Cholecystectomy, Tonsillectomy, Adenoidectomy, Orthopedic (bilat. feet 

surg;RIGHT AND LEFT FOOT SURGERY), Other (stents with pancreatitis-chronic 

pancreatitis, MEDIPORT )





- Family History


Family History: Reports: None





- Social History


Smoking Status: Never smoker


Hx Substance Use: No


Alcohol Screening: Occasionally





- Immunizations


Influenza Vaccine within 12 Months: No


Pneumococcal Vaccine up to Date: No





Physical Exam





- Physical Exam


Appearance: Well-appearing, No pain distress, Well-nourished


Eyes: YEIMY, EOMI, Conjunctiva clear


ENT: Ears normal, Nose normal, Oropharynx normal


Respiratory: Airway patent, Breath sounds clear, Breath sounds equal, 

Respirations nonlabored


Cardiovascular: RRR, Pulses normal, No rub, No murmur


GI/: Soft, Nontender, No masses, Bowel sounds normal, No Organomegaly


Musculoskeletal: Normal strength, ROM intact, No edema, No calf tenderness


Skin: Warm, Dry, Normal color


Neurological: Sensation intact, Motor intact, Reflexes intact, Cranial nerves 

intact, Alert, Oriented


Psychiatric: Affect appropriate, Mood appropriate





Critical Care Note





- Critical Care Note


Total Time (mins): 0





Course





- Course


Orders, Labs, Meds: 





Orders











 Category Date Time Status


 


 Morphine Sulfate [Morphine 2 mg/ml Syringe] MEDS  08/08/18 17:52 Stat





 4 mg IM ONCE STA   


 


 Ondansetron HCl/Pf [Zofran 4 mg/2 ml] MEDS  08/08/18 17:52 Stat





 4 mg IM ONCE STA   











Vital Signs: 





 











  Temp Pulse Resp BP Pulse Ox


 


 08/08/18 17:34  97.5 F L  130 H  18  127/94 H  93 L














Departure





- Departure


Time of Disposition: 17:55


Disposition: HOME SELF-CARE


Discharge Problem: 


 Headache





Migraine


Qualifiers:


 Migraine type: unspecified Status migrainosus presence: without status 

migrainosus Intractability: not intractable Qualified Code(s): G43.909 - 

Migraine, unspecified, not intractable, without status migrainosus





Instructions:  Migraine Headache (ED)


Condition: Good


Pt referred to PMD for follow-up: Yes


IPMP verified?: No


Additional Instructions: 


Please call your Family Physician as soon as possible to schedule a follow-up 

appointment.


Allergies/Adverse Reactions: 


Allergies





latex Allergy (Verified 08/08/18 17:37)


 


divalproex sodium [From Depakote] Adverse Reaction (Verified 08/08/18 17:37)


 


ketorolac [From Toradol] Adverse Reaction (Verified 08/08/18 17:37)


 


metoclopramide HCl [From Reglan] Adverse Reaction (Verified 08/08/18 17:37)


 








Home Medications: 


Ambulatory Orders





Carisoprodol [Soma] 350 mg PO PRN PRN 09/02/15 


Esomeprazole Magnesium [Nexium 24hr] 20 mg PO BID PRN 05/01/18 


Hydrocodone/Acetaminophen [Norco 5-325 Tablet] 1 each PO Q6HR PRN 05/29/18 


Amitriptyline HCl 50 mg PO TID 07/13/18

## 2018-09-18 ENCOUNTER — HOSPITAL ENCOUNTER (OUTPATIENT)
Dept: HOSPITAL 58 - FCC-LAB | Age: 48
Discharge: HOME | End: 2018-09-18
Attending: FAMILY MEDICINE

## 2018-09-18 VITALS — BODY MASS INDEX: 34.2 KG/M2

## 2018-09-18 DIAGNOSIS — G47.00: ICD-10-CM

## 2018-09-18 DIAGNOSIS — R63.5: ICD-10-CM

## 2018-09-18 DIAGNOSIS — E87.6: ICD-10-CM

## 2018-09-18 DIAGNOSIS — K92.1: ICD-10-CM

## 2018-09-18 DIAGNOSIS — F41.9: ICD-10-CM

## 2018-09-18 DIAGNOSIS — F43.0: Primary | ICD-10-CM

## 2018-09-18 DIAGNOSIS — R68.89: ICD-10-CM

## 2018-09-18 DIAGNOSIS — R74.8: ICD-10-CM

## 2018-09-18 PROCEDURE — 80053 COMPREHEN METABOLIC PANEL: CPT

## 2018-09-18 PROCEDURE — 36415 COLL VENOUS BLD VENIPUNCTURE: CPT

## 2018-09-18 PROCEDURE — 84443 ASSAY THYROID STIM HORMONE: CPT

## 2024-12-18 NOTE — ED.PDOC
General


ED Provider: 


Dr. ALE MANLEY





Chief Complaint: Headache


Stated Complaint: headache


Time Seen by Physician: 12:47 (seen with ilana)


Mode of Arrival: Walk-In


Information Source: Patient


Exam Limitations: No limitations


Primary Care Provider: 


MYRON LANGSTON





Nursing and Triage Documentation Reviewed and Agree: Yes





Review of Systems





- Review Of Systems


Constitutional: Reports: No symptoms


Eyes: Reports: No symptoms


Ears, Nose, Mouth, Throat: Reports: No symptoms


Respiratory: Reports: No symptoms


Cardiac: Reports: No symptoms


GI: Reports: No symptoms


: Reports: No symptoms


Musculoskeletal: Reports: No symptoms


Skin: Reports: No symptoms


Neurological: Reports: Headache


Endocrine: Reports: No symptoms


Hematologic/Lymphatic: Reports: No symptoms


All Other Systems: Reviewed and Negative





Past Medical History





- Past Medical History


Previously Healthy: No


Endocrine: Reports: Hypothyroid


Cardiovascular: Reports: Hypertension


Respiratory: Reports: COPD, Asthma


Hematological: Reports: None


Gastrointestinal: Reports: GERD (nexium ), Pancreatitis (stents with 

pancreatitis-chronic pancreatitis)


Genitourinary: Reports: None


Neuro/Psych: Reports: Migraine, Anxiety.  Denies: Depression (fluoxetine)


Musculoskeletal: Reports: Back Pain, Other (soma phenergan)


Cancer: Reports: None


Last Menstrual Period: NA


Other Pertinent Past Medical History: Chronic back pain ,intrathecal pain pump 

and removal





- Surgical History


General Surgical History: Reports: Hysterectomy, , Cholecystectomy, 

Orthopedic (bilat. feet surg), Other (stents with pancreatitis-chronic 

pancreatitis)





- Family History


Family History: Reports: None





- Social History


Smoking Status: Never smoker


Hx Substance Use: No


Alcohol Screening: Occasionally





Physical Exam





- Physical Exam


Appearance: Well-appearing, No pain distress, Well-nourished


Eyes: YEIMY, EOMI, Conjunctiva clear


ENT: Ears normal, Nose normal, Oropharynx normal


Respiratory: Airway patent, Breath sounds clear, Breath sounds equal, 

Respirations nonlabored


Cardiovascular: RRR, Pulses normal, No rub, No murmur


GI/: Soft, Nontender, No masses, Bowel sounds normal, No Organomegaly


Musculoskeletal: Normal strength, ROM intact, No edema, No calf tenderness


Skin: Warm, Dry, Normal color


Neurological: Sensation intact, Motor intact, Reflexes intact, Cranial nerves 

intact, Alert, Oriented


Psychiatric: Affect appropriate, Mood appropriate





Critical Care Note





- Critical Care Note


Total Time (mins): 0





Course





- Course


Orders, Labs, Meds: 





Orders











 Category Date Time Status


 


 Butorphanol Tartrate [Stadol] MEDS  17 12:37 Discontinued





 2 mg IM ONCE STA   


 


 Morphine Sulfate [Morphine 4 mg/ml Syringe] MEDS  17 12:29 Discontinued





 4 mg IM ONCE STA   


 


 Ondansetron HCl/Pf [Zofran 4 mg/2 ml] MEDS  17 12:29 Discontinued





 4 mg IM ONCE STA   


 


 Promethazine HCl [Phenergan 25 mg/ml Vial] MEDS  17 12:37 Discontinued





 25 mg IM ONCE STA   


 


 CT HEAD W/O CONTRAST Stat RADS  17 12:29 Ordered








Medications














Discontinued Medications














Generic Name Dose Route Start Last Admin





  Trade Name Freq  PRN Reason Stop Dose Admin


 


Butorphanol Tartrate  2 mg  17 12:37  





  Stadol  IM  17 12:38  





  ONCE STA   


 


Morphine Sulfate  4 mg  17 12:29  





  Morphine 4 Mg/Ml Syringe  IM  17 12:30  





  ONCE STA   


 


Ondansetron HCl  4 mg  17 12:29  





  Zofran 4 Mg/2 Ml  IM  17 12:30  





  ONCE STA   


 


Promethazine HCl  25 mg  17 12:37  





  Phenergan 25 Mg/Ml Vial  IM  17 12:38  





  ONCE STA   











Vital Signs: 





 











  Temp Pulse Resp BP Pulse Ox


 


 17 12:18  97.8 F  90  16  128/91 H  94 L














Departure





- Departure


Time of Disposition: 14:00


Disposition: HOME SELF-CARE


Discharge Problem: 


 Headache, Migraine





Instructions:  Migraine Headache (ED), Acute Headache (ED)


Condition: Good


Pt referred to PMD for follow-up: No


Allergies/Adverse Reactions: 


Allergies





divalproex sodium [From Depakote] Adverse Reaction (Verified 17 12:16)


 


ketorolac [From Toradol] Adverse Reaction (Verified 17 12:16)


 


metoclopramide HCl [From Reglan] Adverse Reaction (Verified 17 12:16)


 








Home Medications: 


Ambulatory Orders





Carisoprodol [Soma] 350 mg PO PRN PRN 09/02/15 


Promethazine HCl [Phenergan Tab] 50 mg PO PRN PRN 09/02/15 


Amitriptyline HCl [Elavil] 100 mg PO DAILY 17 


Doxepin HCl [Sinequan] 25 mg PO BEDTIME 17 


Sumatriptan Succinate [Imitrex] 50 mg PO TID PRN #10 tablet 17
Name band;